# Patient Record
Sex: FEMALE | Race: WHITE | NOT HISPANIC OR LATINO | Employment: PART TIME | URBAN - NONMETROPOLITAN AREA
[De-identification: names, ages, dates, MRNs, and addresses within clinical notes are randomized per-mention and may not be internally consistent; named-entity substitution may affect disease eponyms.]

---

## 2023-03-28 ENCOUNTER — OFFICE VISIT (OUTPATIENT)
Dept: FAMILY MEDICINE CLINIC | Facility: CLINIC | Age: 38
End: 2023-03-28

## 2023-03-28 VITALS
OXYGEN SATURATION: 98 % | BODY MASS INDEX: 22.2 KG/M2 | TEMPERATURE: 98 F | SYSTOLIC BLOOD PRESSURE: 126 MMHG | DIASTOLIC BLOOD PRESSURE: 78 MMHG | WEIGHT: 130 LBS | HEIGHT: 64 IN | HEART RATE: 90 BPM

## 2023-03-28 DIAGNOSIS — Z12.4 SCREENING FOR CERVICAL CANCER: ICD-10-CM

## 2023-03-28 DIAGNOSIS — Z23 NEED FOR TDAP VACCINATION: ICD-10-CM

## 2023-03-28 DIAGNOSIS — B37.0 THRUSH: ICD-10-CM

## 2023-03-28 DIAGNOSIS — Z00.00 ANNUAL PHYSICAL EXAM: Primary | ICD-10-CM

## 2023-03-28 DIAGNOSIS — Z11.4 SCREENING FOR HIV (HUMAN IMMUNODEFICIENCY VIRUS): ICD-10-CM

## 2023-03-28 DIAGNOSIS — Z11.59 NEED FOR HEPATITIS C SCREENING TEST: ICD-10-CM

## 2023-03-28 DIAGNOSIS — Z00.00 ROUTINE GENERAL MEDICAL EXAMINATION AT A HEALTH CARE FACILITY: ICD-10-CM

## 2023-03-28 DIAGNOSIS — F17.200 TOBACCO USE DISORDER: ICD-10-CM

## 2023-03-28 PROBLEM — F19.91 HISTORY OF ILLICIT DRUG USE: Status: ACTIVE | Noted: 2020-11-13

## 2023-03-28 NOTE — PROGRESS NOTES
ADULT ANNUAL PHYSICAL  [address] [facility]    NAME: Ronni Thrasher  AGE: 42 y o  SEX: female  : 1985     DATE: 3/28/2023     Assessment and Plan:   Pt is a 42 yr old female   Presents in office to establish care and need annual physical   Has an IUD in place for over 7 yrs now has had irregular periods and pelvic pain - referred to gyn for GYN exam , screening and has history of abnormal PAP     Discussed sooner then later follow up   Veronika Haynes 83 noted on exam - Nystatin ordered swish and spit   Smoker - discussed smoking cessation she is planning but not ready she will try to slow down on her own first and let me know if she needs help   History of alcohol and drug use - denies any use recently   Problem List Items Addressed This Visit        Other    Tobacco use disorder    Relevant Orders    Comprehensive metabolic panel    CBC and differential    TSH, 3rd generation with Free T4 reflex    Lipid panel    UA w Reflex to Microscopic w Reflex to Culture -Lab Collect   Other Visit Diagnoses     Annual physical exam    -  Primary    Screening for HIV (human immunodeficiency virus)        Relevant Orders    : HIV 1/2 AB/AG w Reflex [facility] for 2 yr old and above    Need for hepatitis C screening test        Relevant Orders    Hepatitis C antibody    Routine general medical examination at a health care facility        Relevant Orders    Comprehensive metabolic panel    CBC and differential    TSH, 3rd generation with Free T4 reflex    Lipid panel    UA w Reflex to Microscopic w Reflex to Culture -Lab Collect    BMI 22 0-22 9, adult        Screening for cervical cancer        Relevant Orders    Ambulatory Referral to Gynecology    Need for Tdap vaccination        Relevant Orders    Tdap Vaccine greater than or equal to 6yo    Thrush        Relevant Medications    nystatin (MYCOSTATIN) 500,000 units/5 mL suspension          Immunizations and preventive care screenings were discussed with patient today  Appropriate education was printed on patient's after visit summary  Counseling:  Alcohol/drug use: discussed moderation in alcohol intake, the recommendations for healthy alcohol use, and avoidance of illicit drug use  Dental Health: discussed importance of regular tooth brushing, flossing, and dental visits  Injury prevention: discussed safety/seat belts, safety helmets, smoke detectors, carbon dioxide detectors, and smoking near bedding or upholstery  Sexual health: discussed sexually transmitted diseases, partner selection, use of condoms, avoidance of unintended pregnancy, and contraceptive alternatives  · Exercise: the importance of regular exercise/physical activity was discussed  Recommend exercise 3-5 times per week for at least 30 minutes  Depression Screening and Follow-up Plan: Patient was screened for depression during today's encounter  They screened negative with a PHQ-2 score of 0  Tobacco Cessation Counseling: Tobacco cessation counseling was provided  The patient is sincerely urged to quit consumption of tobacco  She is not ready to quit tobacco  Medication options and side effects of medication discussed  Patient agreed to medication  She will try on her own to slow down  Discussed options available will keep me posted         Return in 3 months (on 6/28/2023)  Chief Complaint:     Chief Complaint   Patient presents with   • Establish Care   • Physical Exam      History of Present Illness:     Adult Annual Physical   Patient here for a comprehensive physical exam  The patient reports problems - see HPI   Diet and Physical Activity  · Diet/Nutrition: well balanced diet  · Exercise: no formal exercise        Depression Screening  PHQ-2/9 Depression Screening    Little interest or pleasure in doing things: 0 - not at all  Feeling down, depressed, or hopeless: 0 - not at all  PHQ-2 Score: 0  PHQ-2 Interpretation: Negative depression screen General Health  · Sleep: sleeps well  · Hearing: normal - bilateral   · Vision: no vision problems  · Dental: no dental visits for >1 year  /GYN Health  · Last menstrual period: irregular   · Contraceptive method: IUD placement, Needs follow up to be changed   · History of STDs?: no      Review of Systems:     Review of Systems   Constitutional: Negative for chills, fatigue, fever and unexpected weight change  HENT: Negative for congestion, rhinorrhea, sore throat and voice change  Eyes: Negative  Respiratory: Negative for cough and shortness of breath  Smoker    Cardiovascular: Negative for chest pain and palpitations  Gastrointestinal: Positive for abdominal pain (pelvic pressure --> follow up GYN )  Negative for abdominal distention, nausea and vomiting  Endocrine: Negative  Genitourinary: Negative for difficulty urinating, flank pain and frequency  Musculoskeletal: Negative for arthralgias and myalgias  Skin: Negative for rash  Allergic/Immunologic: Negative for environmental allergies  Neurological: Negative for headaches  Hematological: Negative for adenopathy  Psychiatric/Behavioral: Negative for sleep disturbance and suicidal ideas  The patient is not nervous/anxious  Past Medical History:     History reviewed  No pertinent past medical history     Past Surgical History:     Past Surgical History:   Procedure Laterality Date   • CERVICAL BIOPSY  W/ LOOP ELECTRODE EXCISION     •  SECTION      x2   • CHOLECYSTECTOMY        Social History:     Social History     Socioeconomic History   • Marital status: Single     Spouse name: None   • Number of children: None   • Years of education: None   • Highest education level: None   Occupational History   • None   Tobacco Use   • Smoking status: Every Day     Packs/day: 1 00     Years: 25 00     Pack years: 25 00     Types: Cigarettes   • Smokeless tobacco: Never   Vaping Use   • Vaping Use: Every "day   • Substances: THC, CBD   Substance and Sexual Activity   • 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   • Drug use: 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, Heroin     Comment: sober for 4 years   • Sexual activity: Yes   Other Topics Concern   • None   Social History Narrative   • None     Social Determinants of Health     Financial Resource Strain: Not on file   Food Insecurity: Not on file   Transportation Needs: Not on file   Physical Activity: Not on file   Stress: Not on file   Social Connections: Not on file   Intimate Partner Violence: Not on file   Housing Stability: Not on file      Family History:     History reviewed  No pertinent family history  Current Medications:     Current Outpatient Medications   Medication Sig Dispense Refill   • nystatin (MYCOSTATIN) 500,000 units/5 mL suspension Apply 5 mL (500,000 Units total) to the mouth or throat 4 (four) times a day for 14 days 280 mL 0     No current facility-administered medications for this visit  Allergies: Allergies   Allergen Reactions   • Codeine Itching     When using tylenol #4        Physical Exam:     /78 (BP Location: Left arm, Patient Position: Sitting, Cuff Size: Adult)   Pulse 90   Temp 98 °F (36 7 °C)   Ht 5' 4\" (1 626 m)   Wt 59 kg (130 lb)   SpO2 98%   BMI 22 31 kg/m²     Physical Exam  Vitals and nursing note reviewed  Constitutional:       Appearance: Normal appearance  Comments: BMI 22 31    HENT:      Head: Atraumatic  Nose: No congestion or rhinorrhea  Comments: THRUSH on the tongue      Mouth/Throat:      Pharynx: No posterior oropharyngeal erythema  Cardiovascular:      Rate and Rhythm: Normal rate and regular rhythm  Heart sounds: Normal heart sounds  Pulmonary:      Breath sounds: Normal breath sounds  Abdominal:      Palpations: Abdomen is soft  Musculoskeletal:         General: Normal range of motion  Cervical back: Normal range of motion     Skin:     " General: Skin is warm  Capillary Refill: Capillary refill takes less than 2 seconds  Neurological:      Mental Status: She is alert and oriented to person, place, and time     Psychiatric:         Mood and Affect: Mood normal          Behavior: Behavior normal           Akiko Cavazos, [address] [facility]

## 2023-03-28 NOTE — PATIENT INSTRUCTIONS
Wellness Visit for Adults   AMBULATORY CARE:   A wellness visit  is when you see your healthcare provider to get screened for health problems  Your healthcare provider will also give you advice on how to stay healthy  Write down your questions so you remember to ask them  Ask your healthcare provider how often you should have a wellness visit  What happens at a wellness visit:  Your healthcare provider will ask about your health, and your family history of health problems  This includes high blood pressure, heart disease, and cancer  He or she will ask if you have symptoms that concern you, if you smoke, and about your mood  You may also be asked about your intake of medicines, supplements, food, and alcohol  Any of the following may be done:  • Your weight  will be checked  Your height may also be checked so your body mass index (BMI) can be calculated  Your BMI shows if you are at a healthy weight  • Your blood pressure  and heart rate will be checked  Your temperature may also be checked  • Blood and urine tests  may be done  Blood tests may be done to check your cholesterol levels  Abnormal cholesterol levels increase your risk for heart disease and stroke  You may also need a blood or urine test to check for diabetes if you are at increased risk  Urine tests may be done to look for signs of an infection or kidney disease  • A physical exam  includes checking your heartbeat and lungs with a stethoscope  Your healthcare provider may also check your skin to look for sun damage  • Screening tests  may be recommended  A screening test is done to check for diseases that may not cause symptoms  The screening tests you may need depend on your age, gender, family history, and lifestyle habits  For example, colorectal screening may be recommended if you are 48years old or older  Screening tests you need if you are a woman:   • A Pap smear  is used to screen for cervical cancer   Pap smears are usually done every 3 to 5 years depending on your age  You may need them more often if you have had abnormal Pap smear test results in the past  Ask your healthcare provider how often you should have a Pap smear  • A mammogram  is an x-ray of your breasts to screen for breast cancer  Experts recommend mammograms every 2 years starting at age 48 years  You may need a mammogram at age 52 years or younger if you have an increased risk for breast cancer  Talk to your healthcare provider about when you should start having mammograms and how often you need them  Vaccines you may need:   • Get an influenza vaccine  every year  The influenza vaccine protects you from the flu  Several types of viruses cause the flu  The viruses change over time, so new vaccines are made each year  • Get a tetanus-diphtheria (Td) booster vaccine  every 10 years  This vaccine protects you against tetanus and diphtheria  Tetanus is a severe infection that may cause painful muscle spasms and lockjaw  Diphtheria is a severe bacterial infection that causes a thick covering in the back of your mouth and throat  • Get a human papillomavirus (HPV) vaccine  if you are female and aged 23 to 32 or male 23 to 24 and never received it  This vaccine protects you from HPV infection  HPV is the most common infection spread by sexual contact  HPV may also cause vaginal, penile, and anal cancers  • Get a pneumococcal vaccine  if you are aged 72 years or older  The pneumococcal vaccine is an injection given to protect you from pneumococcal disease  Pneumococcal disease is an infection caused by pneumococcal bacteria  The infection may cause pneumonia, meningitis, or an ear infection  • Get a shingles vaccine  if you are 60 or older, even if you have had shingles before  The shingles vaccine is an injection to protect you from the varicella-zoster virus  This is the same virus that causes chickenpox   Shingles is a painful rash that develops in people who had chickenpox or have been exposed to the virus  How to eat healthy:  My Plate is a model for planning healthy meals  It shows the types and amounts of foods that should go on your plate  Fruits and vegetables make up about half of your plate, and grains and protein make up the other half  A serving of dairy is included on the side of your plate  The amount of calories and serving sizes you need depends on your age, gender, weight, and height  Examples of healthy foods are listed below:  • Eat a variety of vegetables  such as dark green, red, and orange vegetables  You can also include canned vegetables low in sodium (salt) and frozen vegetables without added butter or sauces  • Eat a variety of fresh fruits , canned fruit in 100% juice, frozen fruit, and dried fruit  • Include whole grains  At least half of the grains you eat should be whole grains  Examples include whole-wheat bread, wheat pasta, brown rice, and whole-grain cereals such as oatmeal     • Eat a variety of protein foods such as seafood (fish and shellfish), lean meat, and poultry without skin (turkey and chicken)  Examples of lean meats include pork leg, shoulder, or tenderloin, and beef round, sirloin, tenderloin, and extra lean ground beef  Other protein foods include eggs and egg substitutes, beans, peas, soy products, nuts, and seeds  • Choose low-fat dairy products such as skim or 1% milk or low-fat yogurt, cheese, and cottage cheese  • Limit unhealthy fats  such as butter, hard margarine, and shortening  Exercise:  Exercise at least 30 minutes per day on most days of the week  Some examples of exercise include walking, biking, dancing, and swimming  You can also fit in more physical activity by taking the stairs instead of the elevator or parking farther away from stores  Include muscle strengthening activities 2 days each week  Regular exercise provides many health benefits   It helps you manage your weight, and decreases your risk for type 2 diabetes, heart disease, stroke, and high blood pressure  Exercise can also help improve your mood  Ask your healthcare provider about the best exercise plan for you  General health and safety guidelines:   • Do not smoke  Nicotine and other chemicals in cigarettes and cigars can cause lung damage  Ask your healthcare provider for information if you currently smoke and need help to quit  E-cigarettes or smokeless tobacco still contain nicotine  Talk to your healthcare provider before you use these products  • Limit alcohol  A drink of alcohol is 12 ounces of beer, 5 ounces of wine, or 1½ ounces of liquor  • Lose weight, if needed  Being overweight increases your risk of certain health conditions  These include heart disease, high blood pressure, type 2 diabetes, and certain types of cancer  • Protect your skin  Do not sunbathe or use tanning beds  Use sunscreen with a SPF 15 or higher  Apply sunscreen at least 15 minutes before you go outside  Reapply sunscreen every 2 hours  Wear protective clothing, hats, and sunglasses when you are outside  • Drive safely  Always wear your seatbelt  Make sure everyone in your car wears a seatbelt  A seatbelt can save your life if you are in an accident  Do not use your cell phone when you are driving  This could distract you and cause an accident  Pull over if you need to make a call or send a text message  • Practice safe sex  Use latex condoms if are sexually active and have more than one partner  Your healthcare provider may recommend screening tests for sexually transmitted infections (STIs)  • Wear helmets, lifejackets, and protective gear  Always wear a helmet when you ride a bike or motorcycle, go skiing, or play sports that could cause a head injury  Wear protective equipment when you play sports  Wear a lifejacket when you are on a boat or doing water sports      © Copyright [employer] 2022 Information is for End User's use only and may not be sold, redistributed or otherwise used for commercial purposes  The above information is an  only  It is not intended as medical advice for individual conditions or treatments  Talk to your doctor, nurse or pharmacist before following any medical regimen to see if it is safe and effective for you  Weight Management   AMBULATORY CARE:   Why it is important to manage your weight:  Being overweight increases your risk of health conditions such as heart disease, high blood pressure, type 2 diabetes, and certain types of cancer  It can also increase your risk for osteoarthritis, sleep apnea, and other respiratory problems  Aim for a slow, steady weight loss  Even a small amount of weight loss can lower your risk of health problems  Risks of being overweight:  Extra weight can cause many health problems, including the following:  • Diabetes (high blood sugar level)    • High blood pressure or high cholesterol    • Heart disease    • Stroke    • Gallbladder or liver disease    • Cancer of the colon, breast, prostate, liver, or kidney    • Sleep apnea    • Arthritis or gout    Screening  is done to check for health conditions before you have signs or symptoms  If you are 28to 79years old, your blood sugar level may be checked every 3 years for signs of prediabetes or diabetes  Your healthcare provider will check your blood pressure at each visit  High blood pressure can lead to a stroke or other problems  Your provider may check for signs of heart disease, cancer, or other health problems  How to lose weight safely:  A safe and healthy way to lose weight is to eat fewer calories and get regular exercise  • You can lose up about 1 pound a week by decreasing the number of calories you eat by 500 calories each day  You can decrease calories by eating smaller portion sizes or by cutting out high-calorie foods   Read labels to find out how many calories are in the foods you eat          • You can also burn calories with exercise such as walking, swimming, or biking  You will be more likely to keep weight off if you make these changes part of your lifestyle  Exercise at least 30 minutes per day on most days of the week  You can also fit in more physical activity by taking the stairs instead of the elevator or parking farther away from stores  Ask your healthcare provider about the best exercise plan for you  Healthy meal plan for weight management:  A healthy meal plan includes a variety of foods, contains fewer calories, and helps you stay healthy  A healthy meal plan includes the following:     • Eat whole-grain foods more often  A healthy meal plan should contain fiber  Fiber is the part of grains, fruits, and vegetables that is not broken down by your body  Whole-grain foods are healthy and provide extra fiber in your diet  Some examples of whole-grain foods are whole-wheat breads and pastas, oatmeal, brown rice, and bulgur  • Eat a variety of vegetables every day  Include dark, leafy greens such as spinach, kale, [place] greens, and mustard greens  Eat yellow and orange vegetables such as carrots, sweet potatoes, and winter squash  • Eat a variety of fruits every day  Choose fresh or canned fruit (canned in its own juice or light syrup) instead of juice  Fruit juice has very little or no fiber  • Eat low-fat dairy foods  Drink fat-free (skim) milk or 1% milk  Eat fat-free yogurt and low-fat cottage cheese  Try low-fat cheeses such as mozzarella and other reduced-fat cheeses  • Choose meat and other protein foods that are low in fat  Choose beans or other legumes such as split peas or lentils  Choose fish, skinless poultry (chicken or turkey), or lean cuts of red meat (beef or pork)  Before you cook meat or poultry, cut off any visible fat  • Use less fat and oil  Try baking foods instead of frying them   Add less fat, such as margarine, sour cream, regular salad dressing and mayonnaise to foods  Eat fewer high-fat foods  Some examples of high-fat foods include french fries, doughnuts, ice cream, and cakes  • Eat fewer sweets  Limit foods and drinks that are high in sugar  This includes candy, cookies, regular soda, and sweetened drinks  Ways to decrease calories:   • Eat smaller portions  ? Use a small plate with smaller servings  ? Do not eat second helpings  ? When you eat at a restaurant, ask for a box and place half of your meal in the box before you eat  ? Share an entrée with someone else  • Replace high-calorie snacks with healthy, low-calorie snacks  ? Choose fresh fruit, vegetables, fat-free rice cakes, or air-popped popcorn instead of potato chips, nuts, or chocolate  ? Choose water or calorie-free drinks instead of soda or sweetened drinks  • Do not shop for groceries when you are hungry  You may be more likely to make unhealthy food choices  Take a grocery list of healthy foods and shop after you have eaten  • Eat regular meals  Do not skip meals  Skipping meals can lead to overeating later in the day  This can make it harder for you to lose weight  Eat a healthy snack in place of a meal if you do not have time to eat a regular meal  Talk with a dietitian to help you create a meal plan and schedule that is right for you  Other things to consider as you try to lose weight:   • Be aware of situations that may give you the urge to overeat, such as eating while watching television  Find ways to avoid these situations  For example, read a book, go for a walk, or do crafts  • Meet with a weight loss support group or friends who are also trying to lose weight  This may help you stay motivated to continue working on your weight loss goals  © Copyright [employer] 2022 Information is for End User's use only and may not be sold, redistributed or otherwise used for commercial purposes    The above information is an  only  It is not intended as medical advice for individual conditions or treatments  Talk to your doctor, nurse or pharmacist before following any medical regimen to see if it is safe and effective for you  Cigarette Smoking and Your Health   AMBULATORY CARE:   Risks to your health if you smoke:  Nicotine and other chemicals found in tobacco and e-cigarettes can damage every cell in your body  Even if you are a light smoker, you have an increased risk for cancer, heart disease, and lung disease  If you are pregnant or have diabetes, smoking increases your risk for complications  Nicotine can affect an adolescent's developing brain  This can lead to trouble thinking, learning, or paying attention  Benefits to your health if you stop smoking:   • You decrease respiratory symptoms such as coughing, wheezing, and shortness of breath  • You reduce your risk for cancers of the lung, mouth, throat, kidney, bladder, pancreas, stomach, and cervix  If you already have cancer, you increase the benefits of chemotherapy  You also reduce your risk for cancer returning or a second cancer from developing  • You reduce your risk for heart disease, blood clots, heart attack, and stroke  • You reduce your risk for lung infections, and diseases such as pneumonia, asthma, chronic bronchitis, and emphysema  • Your circulation improves  More oxygen can be delivered to your body  If you have diabetes, you lower your risk for complications, such as kidney, artery, and eye diseases  You also lower your risk for nerve damage  Nerve damage can lead to amputations, poor vision, and blindness  • You improve your body's ability to heal and to fight infections  • An adolescent can help his or her brain and body develop in a healthy way  Talk to your adolescent about all the health risks of nicotine  If you can, start talking about nicotine when your child is younger than 12 years   This may make it easier for him or her not to start using nicotine as a teenager or adult  Explain to him or her that it is best never to start  It can be hard to try to quit later  Benefits to the health of others if you stop smoking:  Tobacco is harmful to nonsmokers who breathe in your secondhand smoke  The following are ways the health of others around you may improve when you stop smoking:  • You lower the risks for lung cancer and heart disease in nonsmoking adults  • If you are pregnant, you lower the risk for miscarriage, early delivery, low birth weight, and stillbirth  You also lower your baby's risk for SIDS, obesity, developmental delay, and neurobehavioral problems, such as ADHD  • If you have children, you lower their risk for ear infections, colds, pneumonia, bronchitis, and asthma  Follow up with your doctor as directed:  Write down your questions so you remember to ask them during your visits  For support and more information:   • American [employer] Association  [address] [facility]  [address]  Phone: [facility] [address]  Phone: 4- 081 - [phone]  Web Address: [employer]    • Smokefree  gov  Phone: 8- 894 - 047-4524  Web Address: www smokefree  gov  © Copyright Uriah Saba 2022 Information is for End User's use only and may not be sold, redistributed or otherwise used for commercial purposes  The above information is an  only  It is not intended as medical advice for individual conditions or treatments  Talk to your doctor, nurse or pharmacist before following any medical regimen to see if it is safe and effective for you  Cholesterol and Your Health   AMBULATORY CARE:   Cholesterol  is a waxy, fat-like substance  Your body uses cholesterol to make hormones and new cells, and to protect nerves  Cholesterol is made by your body  It also comes from certain foods you eat, such as meat and dairy products  Your healthcare provider can help you set goals for your cholesterol levels   He or she can help you create a plan to meet your goals  Cholesterol level goals: Your cholesterol level goals depend on your risk for heart disease, your age, and your other health conditions  The following are general guidelines:  • Total cholesterol  includes low-density lipoprotein (LDL), high-density lipoprotein (HDL), and triglyceride levels  The total cholesterol level should be lower than 200 mg/dL and is best at about 150 mg/dL  • LDL cholesterol  is called bad cholesterol  because it forms plaque in your arteries  As plaque builds up, your arteries become narrow, and less blood flows through  When plaque decreases blood flow to your heart, you may have chest pain  If plaque completely blocks an artery that brings blood to your heart, you may have a heart attack  Plaque can break off and form blood clots  Blood clots may block arteries in your brain and cause a stroke  The level should be less than 130 mg/dL and is best at about 100 mg/dL  • HDL cholesterol  is called good cholesterol  because it helps remove LDL cholesterol from your arteries  It does this by attaching to LDL cholesterol and carrying it to your liver  Your liver breaks down LDL cholesterol so your body can get rid of it  High levels of HDL cholesterol can help prevent a heart attack and stroke  Low levels of HDL cholesterol can increase your risk for heart disease, heart attack, and stroke  The level should be 60 mg/dL or higher  • Triglycerides  are a type of fat that store energy from foods you eat  High levels of triglycerides also cause plaque buildup  This can increase your risk for a heart attack or stroke  If your triglyceride level is high, your LDL cholesterol level may also be high  The level should be less than 150 mg/dL      Any of the following can increase your risk for high cholesterol:   • Smoking cigarettes    • Being overweight or obese, or not getting enough exercise    • Drinking large amounts of alcohol    • A medical condition such as hypertension (high blood pressure) or diabetes    • Certain genes passed from your parents to you    • Age older than 72 years    What you need to know about having your cholesterol levels checked: Adults 21to 39years of age should have their cholesterol levels checked every 4 to 6 years  Adults 45 years or older should have their cholesterol checked every 1 to 2 years  You may need your cholesterol checked more often, or at a younger age, if you have risk factors for heart disease  You may also need to have your cholesterol checked more often if you have other health conditions, such as diabetes  Blood tests are used to check cholesterol levels  Blood tests measure your levels of triglycerides, LDL cholesterol, and HDL cholesterol  How healthy fats affect your cholesterol levels:  Healthy fats, also called unsaturated fats, help lower LDL cholesterol and triglyceride levels  Healthy fats include the following:  • Monounsaturated fats  are found in foods such as olive oil, canola oil, avocado, nuts, and olives  • Polyunsaturated fats,  such as omega 3 fats, are found in fish, such as salmon, trout, and tuna  They can also be found in plant foods such as flaxseed, walnuts, and soybeans  How unhealthy fats affect your cholesterol levels:  Unhealthy fats increase LDL cholesterol and triglyceride levels  They are found in foods high in cholesterol, saturated fat, and trans fat:  • Cholesterol  is found in eggs, dairy, and meat  • Saturated fat  is found in butter, cheese, ice cream, whole milk, and coconut oil  Saturated fat is also found in meat, such as sausage, hot dogs, and bologna  • Trans fat  is found in liquid oils and is used in fried and baked foods  Foods that contain trans fats include chips, crackers, muffins, sweet rolls, microwave popcorn, and cookies  Treatment  for high cholesterol will also decrease your risk of heart disease, heart attack, and stroke  Treatment may include any of the following:  • Lifestyle changes  may include food, exercise, weight loss, and quitting smoking  You may also need to decrease the amount of alcohol you drink  Your healthcare provider will want you to start with lifestyle changes  Other treatment may be added if lifestyle changes are not enough  Your healthcare provider may recommend you work with a team to manage hyperlipidemia  The team may include medical experts such as a dietitian, an exercise or physical therapist, and a behavior therapist  Your family members may be included in helping you create lifestyle changes  • Medicines  may be given to lower your LDL cholesterol, triglyceride levels, or total cholesterol level  You may need medicines to lower your cholesterol if any of the following is true:    ? You have a history of stroke, TIA, unstable angina, or a heart attack  ? Your LDL cholesterol level is 190 mg/dL or higher  ? You are age 36 to 76 years, have diabetes or heart disease risk factors, and your LDL cholesterol is 70 mg/dL or higher  • Supplements  include fish oil, red yeast rice, and garlic  Fish oil may help lower your triglyceride and LDL cholesterol levels  It may also increase your HDL cholesterol level  Red yeast rice may help decrease your total cholesterol level and LDL cholesterol level  Garlic may help lower your total cholesterol level  Do not take any supplements without talking to your healthcare provider  Food changes you can make to lower your cholesterol levels:  A dietitian can help you create a healthy eating plan  He or she can show you how to read food labels and choose foods low in saturated fat, trans fats, and cholesterol  • Decrease the total amount of fat you eat  Choose lean meats, fat-free or 1% fat milk, and low-fat dairy products, such as yogurt and cheese  Try to limit or avoid red meats   Limit or do not eat fried foods or baked goods, such as cookies  • Replace unhealthy fats with healthy fats  Cook foods in olive oil or canola oil  Choose soft margarines that are low in saturated fat and trans fat  Seeds, nuts, and avocados are other examples of healthy fats  • Eat foods with omega-3 fats  Examples include salmon, tuna, mackerel, walnuts, and flaxseed  Eat fish 2 times per week  Pregnant women should not eat fish that have high levels of mercury, such as shark, swordfish, and luis miguel mackerel  • Increase the amount of high-fiber foods you eat  High-fiber foods can help lower your LDL cholesterol  Aim to get between 20 and 30 grams of fiber each day  Fruits and vegetables are high in fiber  Eat at least 5 servings each day  Other high-fiber foods are whole-grain or whole-wheat breads, pastas, or cereals, and brown rice  Eat 3 ounces of whole-grain foods each day  Increase fiber slowly  You may have abdominal discomfort, bloating, and gas if you add fiber to your diet too quickly  • Eat healthy protein foods  Examples include low-fat dairy products, skinless chicken and turkey, fish, and nuts  • Limit foods and drinks that are high in sugar  Your dietitian or healthcare provider can help you create daily limits for high-sugar foods and drinks  The limit may be lower if you have diabetes or another health condition  Limits can also help you lose weight if needed  Lifestyle changes you can make to lower your cholesterol levels:   • Maintain a healthy weight  Ask your healthcare provider what a healthy weight is for you  Ask him or her to help you create a weight loss plan if needed  Weight loss can decrease your total cholesterol and triglyceride levels  Weight loss may also help keep your blood pressure at a healthy level  • Be physically active throughout the day  Physical activity, such as exercise, can help lower your total cholesterol level and maintain a healthy weight   Physical activity can also help increase your HDL cholesterol level  Work with your healthcare provider to create an program that is right for you  Get at least 30 to 40 minutes of moderate physical activity most days of the week  Examples of exercise include brisk walking, swimming, or biking  Also include strength training at least 2 times each week  Your healthcare providers can help you create a physical activity plan  • Do not smoke  Nicotine and other chemicals in cigarettes and cigars can raise your cholesterol levels  Ask your healthcare provider for information if you currently smoke and need help to quit  E-cigarettes or smokeless tobacco still contain nicotine  Talk to your healthcare provider before you use these products  • Limit or do not drink alcohol  Alcohol can increase your triglyceride levels  Ask your healthcare provider before you drink alcohol  Ask how much is okay for you to drink in 24 hours or 1 week  Follow up with your doctor as directed:  Write down your questions so you remember to ask them during your visits  © Copyright [employer] 2022 Information is for End User's use only and may not be sold, redistributed or otherwise used for commercial purposes  The above information is an  only  It is not intended as medical advice for individual conditions or treatments  Talk to your doctor, nurse or pharmacist before following any medical regimen to see if it is safe and effective for you

## 2023-06-21 ENCOUNTER — TELEPHONE (OUTPATIENT)
Dept: FAMILY MEDICINE CLINIC | Facility: CLINIC | Age: 38
End: 2023-06-21

## 2023-07-13 ENCOUNTER — LAB (OUTPATIENT)
Dept: LAB | Facility: CLINIC | Age: 38
End: 2023-07-13
Payer: COMMERCIAL

## 2023-07-13 DIAGNOSIS — Z11.4 SCREENING FOR HIV (HUMAN IMMUNODEFICIENCY VIRUS): ICD-10-CM

## 2023-07-13 DIAGNOSIS — Z11.59 NEED FOR HEPATITIS C SCREENING TEST: ICD-10-CM

## 2023-07-13 DIAGNOSIS — F17.200 TOBACCO USE DISORDER: ICD-10-CM

## 2023-07-13 DIAGNOSIS — Z00.00 ROUTINE GENERAL MEDICAL EXAMINATION AT A HEALTH CARE FACILITY: ICD-10-CM

## 2023-07-13 LAB
ALBUMIN SERPL BCP-MCNC: 3.7 G/DL (ref 3.5–5)
ALP SERPL-CCNC: 56 U/L (ref 46–116)
ALT SERPL W P-5'-P-CCNC: 12 U/L (ref 12–78)
ANION GAP SERPL CALCULATED.3IONS-SCNC: 6 MMOL/L
AST SERPL W P-5'-P-CCNC: 12 U/L (ref 5–45)
BASOPHILS # BLD AUTO: 0.09 THOUSANDS/ÂΜL (ref 0–0.1)
BASOPHILS NFR BLD AUTO: 1 % (ref 0–1)
BILIRUB SERPL-MCNC: 0.63 MG/DL (ref 0.2–1)
BUN SERPL-MCNC: 14 MG/DL (ref 5–25)
CALCIUM SERPL-MCNC: 8.8 MG/DL (ref 8.3–10.1)
CHLORIDE SERPL-SCNC: 113 MMOL/L (ref 96–108)
CHOLEST SERPL-MCNC: 150 MG/DL
CO2 SERPL-SCNC: 21 MMOL/L (ref 21–32)
CREAT SERPL-MCNC: 0.83 MG/DL (ref 0.6–1.3)
EOSINOPHIL # BLD AUTO: 0.35 THOUSAND/ÂΜL (ref 0–0.61)
EOSINOPHIL NFR BLD AUTO: 3 % (ref 0–6)
ERYTHROCYTE [DISTWIDTH] IN BLOOD BY AUTOMATED COUNT: 12.8 % (ref 11.6–15.1)
GFR SERPL CREATININE-BSD FRML MDRD: 89 ML/MIN/1.73SQ M
GLUCOSE P FAST SERPL-MCNC: 91 MG/DL (ref 65–99)
HCT VFR BLD AUTO: 43.3 % (ref 34.8–46.1)
HDLC SERPL-MCNC: 68 MG/DL
HGB BLD-MCNC: 14.3 G/DL (ref 11.5–15.4)
IMM GRANULOCYTES # BLD AUTO: 0.05 THOUSAND/UL (ref 0–0.2)
IMM GRANULOCYTES NFR BLD AUTO: 0 % (ref 0–2)
LDLC SERPL CALC-MCNC: 70 MG/DL (ref 0–100)
LYMPHOCYTES # BLD AUTO: 3.19 THOUSANDS/ÂΜL (ref 0.6–4.47)
LYMPHOCYTES NFR BLD AUTO: 28 % (ref 14–44)
MCH RBC QN AUTO: 30.6 PG (ref 26.8–34.3)
MCHC RBC AUTO-ENTMCNC: 33 G/DL (ref 31.4–37.4)
MCV RBC AUTO: 93 FL (ref 82–98)
MONOCYTES # BLD AUTO: 0.88 THOUSAND/ÂΜL (ref 0.17–1.22)
MONOCYTES NFR BLD AUTO: 8 % (ref 4–12)
NEUTROPHILS # BLD AUTO: 6.72 THOUSANDS/ÂΜL (ref 1.85–7.62)
NEUTS SEG NFR BLD AUTO: 60 % (ref 43–75)
NONHDLC SERPL-MCNC: 82 MG/DL
NRBC BLD AUTO-RTO: 0 /100 WBCS
PLATELET # BLD AUTO: 288 THOUSANDS/UL (ref 149–390)
PMV BLD AUTO: 11.6 FL (ref 8.9–12.7)
POTASSIUM SERPL-SCNC: 4.1 MMOL/L (ref 3.5–5.3)
PROT SERPL-MCNC: 7.1 G/DL (ref 6.4–8.4)
RBC # BLD AUTO: 4.67 MILLION/UL (ref 3.81–5.12)
SODIUM SERPL-SCNC: 140 MMOL/L (ref 135–147)
TRIGL SERPL-MCNC: 60 MG/DL
TSH SERPL DL<=0.05 MIU/L-ACNC: 1.03 UIU/ML (ref 0.45–4.5)
WBC # BLD AUTO: 11.28 THOUSAND/UL (ref 4.31–10.16)

## 2023-07-13 PROCEDURE — 86803 HEPATITIS C AB TEST: CPT

## 2023-07-13 PROCEDURE — 80061 LIPID PANEL: CPT

## 2023-07-13 PROCEDURE — 80053 COMPREHEN METABOLIC PANEL: CPT

## 2023-07-13 PROCEDURE — 36415 COLL VENOUS BLD VENIPUNCTURE: CPT

## 2023-07-13 PROCEDURE — 85025 COMPLETE CBC W/AUTO DIFF WBC: CPT

## 2023-07-13 PROCEDURE — 87389 HIV-1 AG W/HIV-1&-2 AB AG IA: CPT

## 2023-07-13 PROCEDURE — 84443 ASSAY THYROID STIM HORMONE: CPT

## 2023-07-14 LAB
HCV AB SER QL: NORMAL
HIV 1+2 AB+HIV1 P24 AG SERPL QL IA: NORMAL
HIV 2 AB SERPL QL IA: NORMAL
HIV1 AB SERPL QL IA: NORMAL
HIV1 P24 AG SERPL QL IA: NORMAL

## 2023-07-14 NOTE — RESULT ENCOUNTER NOTE
Please keep appt as planned to further discuss   Elevated WBC is she feeling ok if so it can wait   Sometimes smoking or exposure to smoke can cause elevated WBC and dehydration   Hydrate well and avoid smoking

## 2023-07-19 ENCOUNTER — OFFICE VISIT (OUTPATIENT)
Dept: FAMILY MEDICINE CLINIC | Facility: CLINIC | Age: 38
End: 2023-07-19
Payer: COMMERCIAL

## 2023-07-19 VITALS
HEART RATE: 82 BPM | HEIGHT: 64 IN | OXYGEN SATURATION: 99 % | TEMPERATURE: 97.5 F | WEIGHT: 127 LBS | SYSTOLIC BLOOD PRESSURE: 110 MMHG | DIASTOLIC BLOOD PRESSURE: 74 MMHG | BODY MASS INDEX: 21.68 KG/M2
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
## 2023-07-19 DIAGNOSIS — D72.829 LEUKOCYTOSIS, UNSPECIFIED TYPE: ICD-10-CM

## 2023-07-19 DIAGNOSIS — N89.8 VAGINAL DISCHARGE: ICD-10-CM

## 2023-07-19 PROCEDURE — 99213 OFFICE O/P EST LOW 20 MIN: CPT | Performed by: NURSE PRACTITIONER

## 2023-07-19 RX ORDER — FLUCONAZOLE 150 MG/1
150 TABLET ORAL WEEKLY
Qty: 2 TABLET | Refills: 0 | Status: SHIPPED | OUTPATIENT
Start: 2023-07-19 | End: 2023-08-02

## 2023-07-19 RX ORDER — CEPHALEXIN 500 MG/1
500 CAPSULE ORAL EVERY 8 HOURS SCHEDULED
Qty: 21 CAPSULE | Refills: 0 | Status: SHIPPED | OUTPATIENT
Start: 2023-07-19 | End: 2023-07-26

## 2023-07-19 NOTE — PROGRESS NOTES
Assessment/Plan:    Pt is a 42 yr old female   Presents in office for follow up lab results and has an insect bite to the back   Also discussed urinary issues and complaints of vaginal discharge   Discussed treatment options  Follow up GYN visit recommended      Problem List Items Addressed This Visit    None  Visit Diagnoses     Insect bite, unspecified site, initial encounter    -  Primary    Relevant Medications    cephalexin (KEFLEX) 500 mg capsule    fluconazole (DIFLUCAN) 150 mg tablet    mupirocin (BACTROBAN) 2 % ointment    Vaginal discharge        Relevant Medications    cephalexin (KEFLEX) 500 mg capsule    fluconazole (DIFLUCAN) 150 mg tablet    mupirocin (BACTROBAN) 2 % ointment            Subjective:      Patient ID: Megan Molina is a 42 y.o. female. Pt is a 42 yr old female   Presents in office for follow up lab results and has an insect bite to the back   Also discussed urinary issues and complaints of vaginal discharge   Discussed treatment options  Follow up GYN visit recommended       The following portions of the patient's history were reviewed and updated as appropriate:   Past Medical History:  She has no past medical history on file.,  _______________________________________________________________________  Medical Problems:  does not have any pertinent problems on file.,  _______________________________________________________________________  Past Surgical History:   has a past surgical history that includes Cholecystectomy ();  section; and Cervical biopsy w/ loop electrode excision (). ,  _______________________________________________________________________  Family History:  family history is not on file.,  _______________________________________________________________________  Social History:   reports that she has been smoking cigarettes. She has a 25.00 pack-year smoking history.  She has never used smokeless tobacco. She reports that she does not currently use alcohol. She reports that she does not currently use drugs after having used the following drugs: Oxycodone and Heroin. ,  _______________________________________________________________________  Allergies:  is allergic to codeine. .  _______________________________________________________________________  Current Outpatient Medications   Medication Sig Dispense Refill   • cephalexin (KEFLEX) 500 mg capsule Take 1 capsule (500 mg total) by mouth every 8 (eight) hours for 7 days 21 capsule 0   • fluconazole (DIFLUCAN) 150 mg tablet Take 1 tablet (150 mg total) by mouth once a week for 14 days 2 tablet 0   • mupirocin (BACTROBAN) 2 % ointment Apply topically 3 (three) times a day for 10 days 22 g 0     No current facility-administered medications for this visit.     _______________________________________________________________________  Review of Systems   Constitutional: Negative for chills, fatigue, fever and unexpected weight change. HENT: Negative for congestion, rhinorrhea, sore throat and voice change. Eyes: Negative. Respiratory: Negative for cough and shortness of breath. Smoker    Cardiovascular: Negative for chest pain and palpitations. Gastrointestinal: Positive for abdominal pain (pelvic pressure --> follow up GYN ). Negative for abdominal distention, nausea and vomiting. Endocrine: Negative. Genitourinary: Positive for difficulty urinating, dysuria and vaginal discharge. Negative for flank pain and frequency. Musculoskeletal: Negative for arthralgias and myalgias. Skin: Positive for rash (insect bite to back ). Allergic/Immunologic: Negative for environmental allergies. Neurological: Negative for headaches. Hematological: Negative for adenopathy. Elevated WBC   Psychiatric/Behavioral: Negative for sleep disturbance and suicidal ideas. The patient is not nervous/anxious.           Objective:  Vitals:    07/19/23 0851   BP: 110/74   BP Location: Left arm   Patient Position: Sitting   Cuff Size: Adult   Pulse: 82   Temp: 97.5 °F (36.4 °C)   SpO2: 99%   Weight: 57.6 kg (127 lb)   Height: 5' 4" (1.626 m)     Body mass index is 21.8 kg/m². Physical Exam  Vitals and nursing note reviewed. Constitutional:       Appearance: Normal appearance. HENT:      Head: Atraumatic. Eyes:      Extraocular Movements: Extraocular movements intact. Cardiovascular:      Rate and Rhythm: Normal rate and regular rhythm. Pulses: Normal pulses. Heart sounds: Normal heart sounds. Pulmonary:      Effort: Pulmonary effort is normal.      Breath sounds: Normal breath sounds. Abdominal:      Palpations: Abdomen is soft. Skin:     Findings: Erythema and rash present. Comments: Upper back insect bite    Neurological:      Mental Status: She is alert and oriented to person, place, and time. Psychiatric:         Mood and Affect: Mood normal.         Behavior: Behavior normal.           Contains abnormal data CBC and differential  Order: [id number]   Status: Final result      Visible to patient: No (inaccessible in [facility])      Next appt: 01/16/2024 at 11:00 AM in [address] [place] [address], [address] [place] [address])      Dx:  Tobacco use disorder; Routine general...      2 Result Notes       Component Ref Range & Units 7/13/23 10:53 AM   WBC 4.31 - 10.16 Thousand/uL 11.28 High     RBC 3.81 - 5.12 Million/uL 4.67    Hemoglobin 11.5 - 15.4 g/dL 14.3    Hematocrit 34.8 - 46.1 % 43.3    MCV 82 - 98 fL 93    MCH 26.8 - 34.3 pg 30.6    MCHC 31.4 - 37.4 g/dL 33.0    RDW 11.6 - 15.1 % 12.8    MPV 8.9 - 12.7 fL 11.6    Platelets 815 - 861 Thousands/uL 288    nRBC /100 WBCs 0    Neutrophils Relative 43 - 75 % 60    Immat GRANS % 0 - 2 % 0    Lymphocytes Relative 14 - 44 % 28    Monocytes Relative 4 - 12 % 8    Eosinophils Relative 0 - 6 % 3    Basophils Relative 0 - 1 % 1    Neutrophils Absolute 1.85 - 7.62 Thousands/µL 6.72    Immature Grans Absolute 0.00 - 0.20 Thousand/uL 0.05 Lymphocytes Absolute 0.60 - 4.47 Thousands/µL 3.19    Monocytes Absolute 0.17 - 1.22 Thousand/µL 0.88    Eosinophils Absolute 0.00 - 0.61 Thousand/µL 0.35    Basophils Absolute 0.00 - 0.10 Thousands/µL 0.09               Specimen Collected: 07/13/23 10:53 AM Last Resulted: 07/13/23  7:05 PM              Contains abnormal data Comprehensive metabolic panel  Order: [id number]   Status: Final result      Visible to patient: No (inaccessible in [facility])      Next appt: 01/16/2024 at 11:00 AM in [address] [place] [address], [facility])      Dx: Tobacco use disorder; Routine general...      2 Result Notes       Component Ref Range & Units 7/13/23 10:53 AM   Sodium 135 - 147 mmol/L 140    Potassium 3.5 - 5.3 mmol/L 4.1    Chloride 96 - 108 mmol/L 113 High     CO2 21 - 32 mmol/L 21    ANION GAP mmol/L 6    BUN 5 - 25 mg/dL 14    Creatinine 0.60 - 1.30 mg/dL 0.83    Comment: Standardized to IDMS reference method   Glucose, Fasting 65 - 99 mg/dL 91    Comment: Specimen collection should occur prior to Sulfasalazine administration due to the potential for falsely depressed results. Specimen collection should occur prior to Sulfapyridine administration due to the potential for falsely elevated results. Calcium 8.3 - 10.1 mg/dL 8.8    AST 5 - 45 U/L 12    Comment: Specimen collection should occur prior to Sulfasalazine administration due to the potential for falsely depressed results. ALT 12 - 78 U/L 12    Comment: Specimen collection should occur prior to Sulfasalazine and/or Sulfapyridine administration due to the potential for falsely depressed results. Alkaline Phosphatase 46 - 116 U/L 56    Total Protein 6.4 - 8.4 g/dL 7.1    Albumin 3.5 - 5.0 g/dL 3.7    Total Bilirubin 0.20 - 1.00 mg/dL 0.63    Comment: Use of this assay is not recommended for patients undergoing treatment with eltrombopag due to the potential for falsely elevated results.    eGFR ml/min/1.73sq m 89               [facility]    National Kidney Disease Foundation guidelines for Chronic Kidney Disease (CKD):   •  Stage 1 with normal or high GFR (GFR > 90 mL/min/1.73 square meters)   •  Stage 2 Mild CKD (GFR = 60-89 mL/min/1.73 square meters)   •  Stage 3A Moderate CKD (GFR = 45-59 mL/min/1.73 square meters)   •  Stage 3B Moderate CKD (GFR = 30-44 mL/min/1.73 square meters)   •  Stage 4 Severe CKD (GFR = 15-29 mL/min/1.73 square meters)   •  Stage 5 End Stage CKD (GFR <15 mL/min/1.73 square meters)   Note: GFR calculation is accurate only with a steady state creatinine      Specimen Collected: 07/13/23 10:53 AM Last Resulted: 07/13/23  7:35 PM         Lab Flowsheet      Order Details      View Encounter      Lab and Collection Details      Routing      Result History     View All Conversations on this Encounter           Related Result Highlights     Lipid panel  Final result 7/13/2023                       Result Care Coordination      Result Notes     Carlyle Rose   7/14/2023  4:22 PM EDT Back to Top      Advised pt     JEANIE Gamez   7/14/2023  4:16 PM EDT       Please keep appt as planned to further discuss   Elevated WBC is she feeling ok if so it can wait   Sometimes smoking or exposure to smoke can cause elevated WBC and dehydration   Hydrate well and avoid smoking            Contains abnormal data UA w Reflex to Microscopic w Reflex to Culture -Lab Collect  Order: [id number]   Status: Final result      Visible to patient: No (inaccessible in [facility])      Next appt: 01/16/2024 at 11:00 AM in [address] [facility] JEANIE Gamez)      Dx:  Tobacco use disorder; Routine general...     Specimen Information: Urine, Other    1 Result Note       Component Ref Range & Units 7/13/23 10:53 AM   Color, UA  Yellow    Clarity, UA  Turbid    Specific Gravity, UA 1.003 - 1.030 1.019    pH, UA 4.5, 5.0, 5.5, 6.0, 6.5, 7.0, 7.5, 8.0 5.5    Leukocytes, UA Negative Trace Abnormal     Nitrite, UA Negative Negative    Protein, UA Negative mg/dl Trace Abnormal     Glucose, UA Negative mg/dl Negative    Ketones, UA Negative mg/dl Negative    Urobilinogen, UA <2.0 mg/dl mg/dl <2.0    Bilirubin, UA Negative Negative    Occult Blood, UA Negative Moderate Abnormal

## 2024-01-18 ENCOUNTER — LAB (OUTPATIENT)
Dept: LAB | Facility: CLINIC | Age: 39
End: 2024-01-18
Payer: COMMERCIAL

## 2024-01-18 DIAGNOSIS — N89.8 VAGINAL DISCHARGE: ICD-10-CM

## 2024-01-18 DIAGNOSIS — D72.829 LEUKOCYTOSIS, UNSPECIFIED TYPE: ICD-10-CM
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
## 2024-01-18 LAB
AMORPH URATE CRY URNS QL MICRO: ABNORMAL
BACTERIA UR QL AUTO: ABNORMAL /HPF
BASOPHILS # BLD AUTO: 0.09 THOUSANDS/ÂΜL (ref 0–0.1)
BASOPHILS NFR BLD AUTO: 1 % (ref 0–1)
BILIRUB UR QL STRIP: NEGATIVE
CLARITY UR: ABNORMAL
COLOR UR: YELLOW
EOSINOPHIL # BLD AUTO: 0.26 THOUSAND/ÂΜL (ref 0–0.61)
EOSINOPHIL NFR BLD AUTO: 2 % (ref 0–6)
ERYTHROCYTE [DISTWIDTH] IN BLOOD BY AUTOMATED COUNT: 12.9 % (ref 11.6–15.1)
GLUCOSE UR STRIP-MCNC: NEGATIVE MG/DL
HCT VFR BLD AUTO: 42.1 % (ref 34.8–46.1)
HGB BLD-MCNC: 14 G/DL (ref 11.5–15.4)
HGB UR QL STRIP.AUTO: NEGATIVE
IMM GRANULOCYTES # BLD AUTO: 0.03 THOUSAND/UL (ref 0–0.2)
IMM GRANULOCYTES NFR BLD AUTO: 0 % (ref 0–2)
KETONES UR STRIP-MCNC: NEGATIVE MG/DL
LEUKOCYTE ESTERASE UR QL STRIP: NEGATIVE
LYMPHOCYTES # BLD AUTO: 4.09 THOUSANDS/ÂΜL (ref 0.6–4.47)
LYMPHOCYTES NFR BLD AUTO: 38 % (ref 14–44)
MCH RBC QN AUTO: 30.2 PG (ref 26.8–34.3)
MCHC RBC AUTO-ENTMCNC: 33.3 G/DL (ref 31.4–37.4)
MCV RBC AUTO: 91 FL (ref 82–98)
MONOCYTES # BLD AUTO: 0.81 THOUSAND/ÂΜL (ref 0.17–1.22)
MONOCYTES NFR BLD AUTO: 8 % (ref 4–12)
MUCOUS THREADS UR QL AUTO: ABNORMAL
NEUTROPHILS # BLD AUTO: 5.51 THOUSANDS/ÂΜL (ref 1.85–7.62)
NEUTS SEG NFR BLD AUTO: 51 % (ref 43–75)
NITRITE UR QL STRIP: NEGATIVE
NON-SQ EPI CELLS URNS QL MICRO: ABNORMAL /HPF
NRBC BLD AUTO-RTO: 0 /100 WBCS
PH UR STRIP.AUTO: 5.5 [PH]
PLATELET # BLD AUTO: 285 THOUSANDS/UL (ref 149–390)
PMV BLD AUTO: 11.1 FL (ref 8.9–12.7)
PROT UR STRIP-MCNC: ABNORMAL MG/DL
RBC # BLD AUTO: 4.64 MILLION/UL (ref 3.81–5.12)
RBC #/AREA URNS AUTO: ABNORMAL /HPF
SP GR UR STRIP.AUTO: 1.02 (ref 1–1.03)
UROBILINOGEN UR STRIP-ACNC: <2 MG/DL
WBC # BLD AUTO: 10.79 THOUSAND/UL (ref 4.31–10.16)
WBC #/AREA URNS AUTO: ABNORMAL /HPF

## 2024-01-18 PROCEDURE — 81001 URINALYSIS AUTO W/SCOPE: CPT

## 2024-01-18 PROCEDURE — 36415 COLL VENOUS BLD VENIPUNCTURE: CPT

## 2024-01-18 PROCEDURE — 85025 COMPLETE CBC W/AUTO DIFF WBC: CPT

## 2024-01-26 ENCOUNTER — OFFICE VISIT (OUTPATIENT)
Dept: FAMILY MEDICINE CLINIC | Facility: CLINIC | Age: 39
End: 2024-01-26
Payer: COMMERCIAL

## 2024-01-26 VITALS
SYSTOLIC BLOOD PRESSURE: 98 MMHG | WEIGHT: 131 LBS | OXYGEN SATURATION: 98 % | HEART RATE: 77 BPM | DIASTOLIC BLOOD PRESSURE: 60 MMHG | TEMPERATURE: 99.7 F | HEIGHT: 64 IN | BODY MASS INDEX: 22.36 KG/M2

## 2024-01-26 DIAGNOSIS — R80.8 OTHER PROTEINURIA: ICD-10-CM

## 2024-01-26 DIAGNOSIS — F17.200 TOBACCO USE DISORDER: ICD-10-CM

## 2024-01-26 DIAGNOSIS — D72.828 OTHER ELEVATED WHITE BLOOD CELL (WBC) COUNT: Primary | ICD-10-CM

## 2024-01-26 DIAGNOSIS — Z12.4 SCREENING FOR CERVICAL CANCER: ICD-10-CM

## 2024-01-26 DIAGNOSIS — Z12.31 ENCOUNTER FOR SCREENING MAMMOGRAM FOR MALIGNANT NEOPLASM OF BREAST: ICD-10-CM

## 2024-01-26 PROCEDURE — 99213 OFFICE O/P EST LOW 20 MIN: CPT | Performed by: NURSE PRACTITIONER

## 2024-01-26 NOTE — PROGRESS NOTES
Assessment/Plan:     Presents in office for follow up and lab review   Smoking cessation discussed    Follow up GYN  discussed   Mammogram needed discussed   1. Other elevated white blood cell (WBC) count  Comments:  SMOKER   will discuss smoking cessation  Orders:  -     CBC and differential; Future; Expected date: 04/30/2024  -     Iron Panel (Includes Ferritin, Iron Sat%, Iron, and TIBC); Future; Expected date: 04/30/2024  -     Comprehensive metabolic panel; Future; Expected date: 04/30/2024  -     Sedimentation rate, automated; Future; Expected date: 04/30/2024  -     HOLLY Scr, IFA, W/Refl Titer/Pattern/Rheumatoid Arthritis Panel 1; Future; Expected date: 04/30/2024    2. Other proteinuria  -     CBC and differential; Future; Expected date: 04/30/2024  -     Iron Panel (Includes Ferritin, Iron Sat%, Iron, and TIBC); Future; Expected date: 04/30/2024  -     Comprehensive metabolic panel; Future; Expected date: 04/30/2024  -     Sedimentation rate, automated; Future; Expected date: 04/30/2024  -     HOLLY Scr, IFA, W/Refl Titer/Pattern/Rheumatoid Arthritis Panel 1; Future; Expected date: 04/30/2024    3. Tobacco use disorder  Assessment & Plan:  We discussed smoking cessation today.    Orders:  -     CBC and differential; Future; Expected date: 04/30/2024  -     Iron Panel (Includes Ferritin, Iron Sat%, Iron, and TIBC); Future; Expected date: 04/30/2024    4. Encounter for screening mammogram for malignant neoplasm of breast  -     Mammo screening bilateral w cad; Future; Expected date: 04/26/2024    5. Screening for cervical cancer  -     Ambulatory Referral to Obstetrics / Gynecology; Future        Elevated white blood cells.  We discussed the fact that she is a smoker that could cause elevated white blood cells, so we discussed smoking cessation.    Proteinuria.  She is to hydrate better and increase protein intake.  We will continue to monitor.    Other tests.  I ordered a mammogram bilateral screening.  She was  "referred to OB/GYN for a Pap smear.    Follow-up  We will repeat some of the blood work again at some point in about 6 months follow-up.   She is to get the preventative medicine as we discussed today, and I will see her back for a physical exam.                Subjective:      Patient ID: Ronni Thrasher is a 38 y.o. female who presents in office for lab review, elevated white blood cells, and proteinuria. She has underlying tobacco use and intermittent marijuana use for joint issues and anxiety.     She is due for a mammogram and a follow-up with OB/GYN    The following portions of the patient's history were reviewed and updated as appropriate: allergies, current medications, past family history, past medical history, past social history, past surgical history, and problem list.    Review of Systems  Constitutional: Negative for excessive fatigue, fever.  HENT: Negative for congestion or postnasal dripping.  Pulmonary: Negative for cough, dyspnea.  Cardiovascular: Negative for chest pain, palpitation, or edema.  Gastrointestinal: Negative for abdominal distention, pelvic pain, nausea, or vomiting.  Endocrine: Negative for endocrine issues.  Genitourinary: Negative for urinary problems, flank pain or other complaints.  Musculoskeletal: Positive for generalized aches and muscle fatigue.  Integumentary: Negative for rashes.  Neurologic: Positive for intermittent headaches, but overall doing okay.  Psychological: Positive for underlying anxiety and sleep issues. Negative for suicidal ideations.      Objective:  BP 98/60 (BP Location: Left arm, Patient Position: Sitting, Cuff Size: Adult)   Pulse 77   Temp 99.7 °F (37.6 °C)   Ht 5' 4\"   Wt 59.4 kg (131 lb)   SpO2 98%   BMI 22.49 kg/m²          Physical Exam  Constitutional:       Appearance: She is well-developed.   HENT:      Head: Normocephalic and atraumatic.      Right Ear: Tympanic membrane normal.      Left Ear: Tympanic membrane normal.      Mouth/Throat:      " Pharynx: Uvula midline.     Neck: No neck rigidity or adenopathy.  Eyes:      Pupils: Pupils are equal, round, and reactive to light.   Cardiovascular:      Rate and Rhythm: Normal rate.   Pulmonary:      Lung sounds: No wheezing or rhonchi.     Effort: Pulmonary effort is normal.   Abdominal:      Palpations: Abdomen is soft and nondistended.     Bowel sounds: She has bowel sounds x4.  Musculoskeletal:     General: Range of motion is within normal limits.     Cervical back: Normal range of motion.     Extremities: No edema on bilateral lower extremities.  Skin:     General: Skin is warm.  Mental status: She is alert and oriented x3.  Behavior: Normal mood and behavior.    I have personally reviewed results with the patient.    Laboratory Studies  White blood cells are 10.79 with normal differentials.  Normal H&H and platelets.  Urinalysis shows some trace protein.        Transcribed for JEANIE Navarro, by Charlton Reyes on 01/30/24 at 4:05 PM. Powered by [employer].

## 2024-03-26 NOTE — PROGRESS NOTES
Diagnoses and all orders for this visit:    Encounter for annual routine gynecological examination  -     Liquid-based pap, screening    Encounter for screening mammogram for malignant neoplasm of breast    Screening for cervical cancer  -     Ambulatory Referral to Obstetrics / Gynecology    We have reviewed mechanism of action, benefits, risks, side effects of LARC's & IUD placement.  Advised to schedule for IUD insertion.  Patient has been advised to avoid intercourse for 2 weeks prior to insertion,  patient has been advised to take 600 mg of ibuprofen with food 1 hour prior to the appointment to help diminish cramping post placement.   Advised fertility is restored use condoms for barrier method until new IUD is placed    Perineal hygiene reviewed   Weight bearing exercises minium of 150 mins/weekly advised.   Kegel exercises recommended daily, see AVS for instructions and recommendations  SBE encouraged, ASCCP guidelines reviewed. Condoms encouraged with all sexual activity to prevent STI's.   Gardisil vaccines recommended up to age 45  Calcium/ Vit D dietary requirements discussed,   Advised to call with any issues,  all concerns & questions addressed.   See provided information in your after visit summary     F/U Annually and PRN      Health Maintenance:    Last PAP: 2011 Negative ( [facility])  Next PAP Due:collect today     Last Mammogram: Not on file    Next Mammogram: has script from PCP    Last Colonoscopy: Not on file    advised age 43    Gardisil:  Not completed       Subjective    CC: Yearly Exam , New patient      Ronni Thrasher is a 38 y.o. female here for an annual exam.   GYN hx includes:  1 vaginal , 2 c sections, Leep   No personal Hx of breast, cervical, ovarian or colon CA.   Family hx of:   PGM- colon cancer Unsure,  Mgg aunt  Breast cancer /  Medically stable, reports no changes in medical Hx, follows with PMD  Daily smoker    No LMP recorded (exact date). Patient has had an  implant.  Her menstrual cycles are rare with IUD. She denies issues with bleeding during her menses.  Historically light menstrual cycles  Reports history of abnormal pap smear.LEEP Path neg . No FU since.   She denies breast concerns, abnormal vaginal discharge, vaginal itching, odor, irritation, bowel/bladder dysfunction, urinary symptoms, pelvic pain, or dyspareunia today.   She is sexually active. Monogamous relationship.  Her current method of contraception includes Mirena LNG-IUD (Mirena, Kyleena, Little) since    . Denies any issues with her BCM.  She does not want STD testing today.  Denies intimate partner violence    Children 19,16,12    Cleans air B&B's     Past Medical History:   Diagnosis Date    Abnormal Pap smear of cervix     HPV (human papilloma virus) anogenital infection     Varicella      Past Surgical History:   Procedure Laterality Date    CERVICAL BIOPSY  W/ LOOP ELECTRODE EXCISION       SECTION      x2    CHOLECYSTECTOMY         Immunization History   Administered Date(s) Administered    Rubella 2007    Tdap 2012, 2023       Family History   Problem Relation Age of Onset    No Known Problems Daughter     No Known Problems Son     No Known Problems Son      Social History     Tobacco Use    Smoking status: Every Day     Current packs/day: 1.00     Average packs/day: 1 pack/day for 25.0 years (25.0 ttl pk-yrs)     Types: Cigarettes    Smokeless tobacco: Never   Vaping Use    Vaping status: Every Day    Substances: THC, CBD   Substance Use Topics    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    Drug use: 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, Heroin     Comment: sober for 4 years       Current Outpatient Medications:     mupirocin (BACTROBAN) 2 % ointment, Apply topically 3 (three) times a day for 10 days, Disp: 22 g, Rfl: 0  Patient Active Problem List    Diagnosis Date Noted    History of illicit drug use 2020    History of   "delivery 2012    Hx LEEP (loop electrosurgical excision procedure), cervix, pregnancy 2012    Tobacco use disorder 2011    Papanicolaou smear of cervix with high grade squamous intraepithelial lesion (HGSIL) 2011       Allergies   Allergen Reactions    Codeine Itching     When using tylenol #4         OB History    Para Term  AB Living   3 3       3   SAB IAB Ectopic Multiple Live Births           3      # Outcome Date GA Lbr Alireza/2nd Weight Sex Delivery Anes PTL Lv   3 Para            2 Para            1 Para               Obstetric Comments    1 vaginal delivery & 2  deliveries       Vitals:    24 1252   BP: 128/72   BP Location: Right arm   Patient Position: Sitting   Cuff Size: Large   Weight: 59.4 kg (131 lb)   Height: 5' 4\" (1.626 m)     Body mass index is 22.49 kg/m².    Review of Systems     Constitutional: Negative for chills, fatigue, fever, headaches, visual disturbances, and unexpected weight change.   Respiratory: Negative for cough, & shortness of breath.  Cardiovascular: Negative for chest pain. .    Gastrointestinal: Negative for Abd pain, nausea & vomiting, constipation and diarrhea.   Genitourinary: Negative for difficulty urinating, dysuria, hematuria,  unusual vaginal bleeding or discharge  Skin: Negative skin changes    Physical Exam     Constitutional: Alert & Oriented x3, well-developed and well-nourished. No distress.   HENT: Atraumatic, Normocephalic, Conjunctivae clear  Neck: Normal range of motion. Neck supple. No thyromegaly, mass, nodules or tenderness  Pulmonary: Effort normal.   Abdominal: Soft. No tenderness or masses  Musculoskeletal: Normal ROM  Skin: Warm & Dry  Psychological: Normal mood, thought content, behavior & judgement     Breasts:   Right: tissue soft without masses, tenderness, skin changes or nipple discharge. No areas of erythema or pain. No subclavicular, axillary, pectoral adenopathy  Left:  tissue soft without " masses, tenderness, skin changes or nipple discharge. No areas of erythema or pain. No subclavicular, axillary, pectoral adenopathy    Pelvic exam was performed with patient supine, lithotomy position.      Labia: Negative rash, tenderness, lesion or injury on the right labia.              Negative rash, tenderness, lesion or injury on the left labia.   Urethral meatus:  Negative for  tenderness, inflammation or discharge.   Uterus: not deviated, enlarged, fixed or tender.   Cervix: No CMT, no discharge or friability. IUD strings visible with large dark colored mass attached to strings , appears to be a mat of hair IUD removed at today's visit  Right adnexa: no mass, no tenderness and no fullness.  Left adnexa: no mass, no tenderness and no fullness.   Vagina: No erythema, tenderness, masses, or foreign body in the vagina. No signs of injury around the vagina. No unusual vaginal discharge   Perineum without lesions, signs of injury, erythema or swelling.  Inguinal Canal:        Right: No inguinal adenopathy or hernia present.        Left: No inguinal adenopathy or hernia present.

## 2024-03-26 NOTE — PATIENT INSTRUCTIONS
Intrauterine Device   AMBULATORY CARE:   An IUD  is a type of birth control that is inserted into your uterus. It is a small, flexible piece of plastic with a string on the end. It is inserted and removed by your healthcare provider. IUDs prevent sperm from reaching or fertilizing an egg. IUDs also prevent a fertilized egg from attaching to the uterus and developing into a fetus.       Common types of IUDs:  Your healthcare provider will recommend the type of IUD that is right for you. This is based on your age and if you have had a child. If you have not had a child, a smaller IUD will be used.     A copper IUD  slowly releases a small amount of copper into your uterus. This IUD can remain in place for up to 10 years.    A hormone-releasing IUD  slowly releases a small amount of progesterone into your uterus. Progesterone is a hormone that is made by your body to help control your periods. This IUD can remain in place for 3 to 5 years.  Seek care immediately if:   You have severe pain or bleeding during your period.    You have a fever and severe abdominal pain.    Call your doctor or gynecologist if:   You think you are pregnant.    The IUD has come out.    You have bleeding from your vagina after you have sex, and it is not your period.    You have pain during sex.    You cannot feel the IUD string, the string feels longer, or you feel the plastic of the IUD itself.    You have vaginal discharge that is green, yellow, or has a foul odor.    You have questions or concerns about your condition or care.    Advantages of an IUD:   An IUD is 98% to 99% effective in preventing pregnancy.    The IUD can be removed by your healthcare provider if you decide to have a baby. You may be able to get pregnant as soon as the IUD is removed.    An IUD protects you from pregnancy right after it is inserted.    You do not have to stop sexual activity to insert it. You do not have to remember to take your birth control  pill.    Copper IUDs are safer for some women than oral birth control pills. Examples include women who smoke or have a history of blood clots.    Hormone-releasing IUDs may decrease certain health problems. Examples include bleeding and cramping that happen with your monthly period.    Disadvantages of an IUD:   There is a small chance that you could get pregnant. Sometimes the IUD cannot be removed after you get pregnant. This increases your risk of a miscarriage or an ectopic pregnancy. Ectopic pregnancy is when the fertilized egg starts to grow somewhere other than your uterus.    An IUD does not protect you from sexually transmitted infections.    You may have cramps during the first weeks after you get the IUD.    A copper IUD may cause your monthly period to be heavier or more painful. This is more common within the first 3 months after you get the IUD. You may need to have your IUD removed if your bleeding or pain becomes severe. You may have spotting between periods.    There is a small risk of an infection within the first 20 days after the IUD is placed. Infection can lead to pelvic inflammatory disease. This can cause infertility.    Your uterus may tear when the IUD is inserted. The IUD may slip part or all of the way out of your uterus.    Self-care:   NSAIDs , such as ibuprofen, help decrease swelling, pain, and fever. This medicine is available with or without a doctor's order. NSAIDs can cause stomach bleeding or kidney problems in certain people. If you take blood thinner medicine, always ask if NSAIDs are safe for you. Always read the medicine label and follow directions.    Apply heat to relieve pain and cramping.  Use a heating pad set on low. Apply heat to your lower abdomen for 20 minutes every hour, or as directed.    Return to activities as directed.  Your healthcare provider will tell you when it is okay to return to work, school, or other activities.    Do not use a tampon or have sex   until your provider says it is okay.    Make sure your IUD is in place:  An IUD has a string that is made of plastic thread. One to 2 inches of this string hangs into your vagina. You cannot see this string, and it should not cause problems when you have sex. Check your IUD string every 3 days for the first 3 months that you have your IUD. After that, check the string after each monthly period. Do the following to check the placement of your IUD:  Wash your hands with soap and warm water. Dry them with a clean towel.    Bend your knees and squat low to the ground.    Gently put your index finger inside your vagina. The cervix is at the top of the vagina and feels like the tip of your nose. Feel for the IUD string. Do not pull on the string. You should not be able to feel the firm plastic of the IUD itself.     Wash your hands after you check your IUD string.    For more information:   [facility]  [address] [place] [address]  Phone: [phone]  Web Address: http://www.[facility]org    Follow up with your doctor as directed:  Write down your questions so you remember to ask them during your visits.  © Copyright [employer] 2023 Information is for End User's use only and may not be sold, redistributed or otherwise used for commercial purposes.  The above information is an  only. It is not intended as medical advice for individual conditions or treatments. Talk to your doctor, nurse or pharmacist before following any medical regimen to see if it is safe and effective for you.  Breast Self Exam for Women   AMBULATORY CARE:   A breast self-exam (BSE)  is a way to check your breasts for lumps and other changes. Regular BSEs can help you know how your breasts normally look and feel. Most breast lumps or changes are not cancer, but you should always have them checked by a healthcare provider.  Why you should do a BSE:  Breast cancer is the most common  type of cancer in women. Even if you have mammograms, you may still want to do a BSE regularly. If you know how your breasts normally feel and look, it may help you know when to contact your healthcare provider. Mammograms can miss some cancers. You may find a lump during a BSE that did not show up on a mammogram.  When you should do a BSE:  If you have periods, you may want to do your BSE 1 week after your period ends. This is the time when your breasts may be the least swollen, lumpy, or tender. You can do regular BSEs even if you are breastfeeding or have breast implants.  Call your doctor if:   You find any lumps or changes in your breasts.    You have breast pain or fluid coming from your nipples.    You have questions or concerns about your condition or care.    How to do a BSE:       Look at your breasts in a mirror.  Look at the size and shape of each breast and nipple. Check for swelling, lumps, dimpling, scaly skin, or other skin changes. Look for nipple changes, such as a nipple that is painful or beginning to pull inward. Gently squeeze both nipples and check to see if fluid (that is not breast milk) comes out of them. If you find any of these or other breast changes, contact your healthcare provider. Check your breasts while you sit or  the following 3 positions:    Hang your arms down at your sides.    Raise your hands and join them behind your head.    Put firm pressure with your hands on your hips. Bend slightly forward while you look at your breasts in the mirror.    Lie down and feel your breasts.  When you lie down, your breast tissue spreads out evenly over your chest. This makes it easier for you to feel for lumps and anything that may not be normal for your breasts. Do a BSE on one breast at a time.    Place a small pillow or towel under your left shoulder.  Put your left arm behind your head.    Use the 3 middle fingers of your right hand.  Use your fingertip pads, on the top of your  fingers. Your fingertip pad is the most sensitive part of your finger.    Use small circles to feel your breast tissue.  Use your fingertip pads to make dime-sized, overlapping circles on your breast and armpits. Use light, medium, and firm pressure. First, press lightly. Second, press with medium pressure to feel a little deeper into the breast. Last, use firm pressure to feel deep within your breast.    Examine your entire breast area.  Examine the breast area from above the breast to below the breast where you feel only ribs. Make small circles with your fingertips, starting in the middle of your armpit. Make circles going up and down the breast area. Continue toward your breast and all the way across it. Examine the area from your armpit all the way over to the middle of your chest (breastbone). Stop at the middle of your chest.    Move the pillow or towel to your right shoulder, and put your right arm behind your head.  Use the 3 fingertip pads of your left hand, and repeat the above steps to do a BSE on your right breast.  What else you can do to check for breast problems or cancer:  Talk to your healthcare provider about mammograms. A mammogram is an x-ray of your breasts to screen for breast cancer or other problems. Your provider can tell you the benefits and risks of mammograms. The first mammogram is usually at age 45 or 50. Your provider may recommend you start at 40 or younger if your risk for breast cancer is high. Mammograms usually continue every 1 to 2 years until age 74.       Follow up with your doctor as directed:  Write down your questions so you remember to ask them during your visits.  © Copyright [employer] 2023 Information is for End User's use only and may not be sold, redistributed or otherwise used for commercial purposes.  The above information is an  only. It is not intended as medical advice for individual conditions or treatments. Talk to your doctor, nurse or pharmacist  before following any medical regimen to see if it is safe and effective for you.  Wellness Visit for Adults   AMBULATORY CARE:   A wellness visit  is when you see your healthcare provider to get screened for health problems. Your healthcare provider will also give you advice on how to stay healthy. Write down your questions so you remember to ask them. Ask your healthcare provider how often you should have a wellness visit.  What happens at a wellness visit:  Your healthcare provider will ask about your health, and your family history of health problems. This includes high blood pressure, heart disease, and cancer. He or she will ask if you have symptoms that concern you, if you smoke, and about your mood. You may also be asked about your intake of medicines, supplements, food, and alcohol. Any of the following may be done:  Your weight  will be checked. Your height may also be checked so your body mass index (BMI) can be calculated. Your BMI shows if you are at a healthy weight.    Your blood pressure  and heart rate will be checked. Your temperature may also be checked.    Blood and urine tests  may be done. Blood tests may be done to check your cholesterol levels. Abnormal cholesterol levels increase your risk for heart disease and stroke. You may also need a blood or urine test to check for diabetes if you are at increased risk. Urine tests may be done to look for signs of an infection or kidney disease.    A physical exam  includes checking your heartbeat and lungs with a stethoscope. Your healthcare provider may also check your skin to look for sun damage.    Screening tests  may be recommended. A screening test is done to check for diseases that may not cause symptoms. The screening tests you may need depend on your age, gender, family history, and lifestyle habits. For example, colorectal screening may be recommended if you are 50 years old or older.    Screening tests you need if you are a woman:   A Pap  smear  is used to screen for cervical cancer. Pap smears are usually done every 3 to 5 years depending on your age. You may need them more often if you have had abnormal Pap smear test results in the past. Ask your healthcare provider how often you should have a Pap smear.    A mammogram  is an x-ray of your breasts to screen for breast cancer. Experts recommend mammograms every 2 years starting at age 50 years. You may need a mammogram at age 49 years or younger if you have an increased risk for breast cancer. Talk to your healthcare provider about when you should start having mammograms and how often you need them.    Vaccines you may need:   Get an influenza vaccine  every year. The influenza vaccine protects you from the flu. Several types of viruses cause the flu. The viruses change over time, so new vaccines are made each year.    Get a tetanus-diphtheria (Td) booster vaccine  every 10 years. This vaccine protects you against tetanus and diphtheria. Tetanus is a severe infection that may cause painful muscle spasms and lockjaw. Diphtheria is a severe bacterial infection that causes a thick covering in the back of your mouth and throat.    Get a human papillomavirus (HPV) vaccine  if you are female and aged 19 to 26 or male 19 to 21 and never received it. This vaccine protects you from HPV infection. HPV is the most common infection spread by sexual contact. HPV may also cause vaginal, penile, and anal cancers.    Get a pneumococcal vaccine  if you are aged 65 years or older. The pneumococcal vaccine is an injection given to protect you from pneumococcal disease. Pneumococcal disease is an infection caused by pneumococcal bacteria. The infection may cause pneumonia, meningitis, or an ear infection.    Get a shingles vaccine  if you are 60 or older, even if you have had shingles before. The shingles vaccine is an injection to protect you from the varicella-zoster virus. This is the same virus that causes  chickenpox. Shingles is a painful rash that develops in people who had chickenpox or have been exposed to the virus.    How to eat healthy:  My Plate is a model for planning healthy meals. It shows the types and amounts of foods that should go on your plate. Fruits and vegetables make up about half of your plate, and grains and protein make up the other half. A serving of dairy is included on the side of your plate. The amount of calories and serving sizes you need depends on your age, gender, weight, and height. Examples of healthy foods are listed below:  Eat a variety of vegetables  such as dark green, red, and orange vegetables. You can also include canned vegetables low in sodium (salt) and frozen vegetables without added butter or sauces.    Eat a variety of fresh fruits , canned fruit in 100% juice, frozen fruit, and dried fruit.    Include whole grains.  At least half of the grains you eat should be whole grains. Examples include whole-wheat bread, wheat pasta, brown rice, and whole-grain cereals such as oatmeal.    Eat a variety of protein foods such as seafood (fish and shellfish), lean meat, and poultry without skin (turkey and chicken). Examples of lean meats include pork leg, shoulder, or tenderloin, and beef round, sirloin, tenderloin, and extra lean ground beef. Other protein foods include eggs and egg substitutes, beans, peas, soy products, nuts, and seeds.    Choose low-fat dairy products such as skim or 1% milk or low-fat yogurt, cheese, and cottage cheese.    Limit unhealthy fats  such as butter, hard margarine, and shortening.       Exercise:  Exercise at least 30 minutes per day on most days of the week. Some examples of exercise include walking, biking, dancing, and swimming. You can also fit in more physical activity by taking the stairs instead of the elevator or parking farther away from stores. Include muscle strengthening activities 2 days each week. Regular exercise provides many health  benefits. It helps you manage your weight, and decreases your risk for type 2 diabetes, heart disease, stroke, and high blood pressure. Exercise can also help improve your mood. Ask your healthcare provider about the best exercise plan for you.       General health and safety guidelines:   Do not smoke.  Nicotine and other chemicals in cigarettes and cigars can cause lung damage. Ask your healthcare provider for information if you currently smoke and need help to quit. E-cigarettes or smokeless tobacco still contain nicotine. Talk to your healthcare provider before you use these products.    Limit alcohol.  A drink of alcohol is 12 ounces of beer, 5 ounces of wine, or 1½ ounces of liquor.    Lose weight, if needed.  Being overweight increases your risk of certain health conditions. These include heart disease, high blood pressure, type 2 diabetes, and certain types of cancer.    Protect your skin.  Do not sunbathe or use tanning beds. Use sunscreen with a SPF 15 or higher. Apply sunscreen at least 15 minutes before you go outside. Reapply sunscreen every 2 hours. Wear protective clothing, hats, and sunglasses when you are outside.    Drive safely.  Always wear your seatbelt. Make sure everyone in your car wears a seatbelt. A seatbelt can save your life if you are in an accident. Do not use your cell phone when you are driving. This could distract you and cause an accident. Pull over if you need to make a call or send a text message.    Practice safe sex.  Use latex condoms if are sexually active and have more than one partner. Your healthcare provider may recommend screening tests for sexually transmitted infections (STIs).    Wear helmets, lifejackets, and protective gear.  Always wear a helmet when you ride a bike or motorcycle, go skiing, or play sports that could cause a head injury. Wear protective equipment when you play sports. Wear a lifejacket when you are on a boat or doing water sports.    © Copyright  [employer] 2023 Information is for End User's use only and may not be sold, redistributed or otherwise used for commercial purposes.  The above information is an  only. It is not intended as medical advice for individual conditions or treatments. Talk to your doctor, nurse or pharmacist before following any medical regimen to see if it is safe and effective for you.  Kegel Exercises for Women   AMBULATORY CARE:   Kegel exercises  help strengthen your pelvic muscles. Pelvic muscles hold your pelvic organs, such as your bladder and uterus, in place. Kegel exercises help prevent or control certain conditions, such as urine incontinence (leakage) or uterine prolapse.       Call your doctor or physical therapist if:   You cannot feel your muscles tighten or relax.    You continue to leak urine.    You have questions or concerns about your condition or care.    Use the correct muscles:  Pelvic muscles are the muscles you use to control urine flow. To target these muscles, stop and start the flow of urine several times. This will help you become familiar with how it feels to tighten and relax these muscles.  How to do Kegel exercises:   Get into a comfortable position.  You may lie down, stand up, or sit down to do these exercises. When you first try to do these exercises, it may be easier if you lie down.    Tighten or squeeze your pelvic muscles slowly.  It may feel like you are trying to hold back urine or gas. Hold this position for 3 seconds. Relax for 3 seconds. Repeat this cycle 10 times. Do not hold your breath when you do Kegel exercises. Keep your stomach, back, and leg muscles relaxed.    Do 10 sets of Kegel exercises, at least 3 times a day.  When you know how to do Kegel exercises, use different positions. This will help to strengthen your pelvic muscles as much as possible. You can do these exercises while you lie on the floor, watch TV, or while you stand. Tighten your pelvic muscles before you  sneeze, cough, or lift to prevent urine leakage. You may notice improved bladder control within about 6 weeks.    Follow up with your doctor or physical therapist as directed:  Write down your questions so you remember to ask them during your visits.  © Copyright [employer] 2023 Information is for End User's use only and may not be sold, redistributed or otherwise used for commercial purposes.  The above information is an  only. It is not intended as medical advice for individual conditions or treatments. Talk to your doctor, nurse or pharmacist before following any medical regimen to see if it is safe and effective for you.       Perineal Hygiene      Your vaginal naturally takes care of its self, it is a self washing system, the less you mess the healthier it will be     No soaps or feminine wash to the vulva, these products can cause dermitis, bacterial infections and other vulvar problems.   Use only water to cleanse, or water with Dove or Dove Sensitive Skin Bar soap if necessary.    No scented lotions or products are advised in or near your vulva.    Use only coconut oil for moisture if needed.  No douching this may cause imbalance in your vaginal PH and further issues.    If you wear panty liners, you may apply a thin coating of Vaseline, A&D ointment or coconut oil to the vulvar tissues as a skin barrier     Cotton underware, loose fitting clothing  Only perfume-free, dye-free laundry detergent, use a second rinse cycle   Avoid fabric softeners/dryer sheets.       Your partner should avoid the same products as well.       Over the counter probiotic to restore vaginal darwin may be helpful as well, take daily.       You may also look into Boric Acid vaginal suppositories to restore vaginal PH balance for up to 2 weeks as directed on the box. You may not use these if you are pregnant      For vaginal dryness:      You may use:     Coconut oil (organic, pure, unscented) as needed for moisture or  lubrication. ( Do not use if allergic)       Replens moisture restore external comfort gel daily ( use as directed on the box)        Replens long lasting vaginal moisturizer  ( use as directed on the box)         For Vaginal Lubrication:          You may use:     Coconut oil (organic, pure, unscented) as a lubricant or another scent-free lubricant (Astroglide, Uberlube) if needed.  Do not use coconut oil or silicone if using a condom as this may break down the integrity of the condom and cause an unplanned pregnancy              Do not use coconut oil if allergic               Replens silky smooth lubricant, premium silicone based lubricant for intercourse. ( use as directed, a small amount will provide an enhanced natural feeling)     Any premium over the counter vaginal lubricant water or silicone based. Silicone based will have more staying power.

## 2024-03-27 ENCOUNTER — OFFICE VISIT (OUTPATIENT)
Dept: OBGYN CLINIC | Facility: CLINIC | Age: 39
End: 2024-03-27
Payer: COMMERCIAL

## 2024-03-27 VITALS
DIASTOLIC BLOOD PRESSURE: 72 MMHG | SYSTOLIC BLOOD PRESSURE: 128 MMHG | BODY MASS INDEX: 22.36 KG/M2 | HEIGHT: 64 IN | WEIGHT: 131 LBS

## 2024-03-27 DIAGNOSIS — Z12.31 ENCOUNTER FOR SCREENING MAMMOGRAM FOR MALIGNANT NEOPLASM OF BREAST: ICD-10-CM

## 2024-03-27 DIAGNOSIS — Z12.4 SCREENING FOR CERVICAL CANCER: ICD-10-CM

## 2024-03-27 DIAGNOSIS — Z30.432 ENCOUNTER FOR IUD REMOVAL: ICD-10-CM

## 2024-03-27 DIAGNOSIS — Z01.419 ENCOUNTER FOR ANNUAL ROUTINE GYNECOLOGICAL EXAMINATION: Primary | ICD-10-CM

## 2024-03-27 PROCEDURE — G0145 SCR C/V CYTO,THINLAYER,RESCR: HCPCS | Performed by: OBSTETRICS & GYNECOLOGY

## 2024-03-27 PROCEDURE — G0476 HPV COMBO ASSAY CA SCREEN: HCPCS | Performed by: OBSTETRICS & GYNECOLOGY

## 2024-03-27 PROCEDURE — 58301 REMOVE INTRAUTERINE DEVICE: CPT | Performed by: OBSTETRICS & GYNECOLOGY

## 2024-03-27 PROCEDURE — 99386 PREV VISIT NEW AGE 40-64: CPT | Performed by: OBSTETRICS & GYNECOLOGY

## 2024-03-27 NOTE — PROGRESS NOTES
"Iud removal    Date/Time: 3/27/2024 1:00 PM    Performed by: JEANIE Barry  Authorized by: JEANIE Barry  Universal Protocol:  Procedure performed by:  Consent: Verbal consent obtained. Written consent not obtained.  Risks and benefits: risks, benefits and alternatives were discussed  Consent given by: patient  Time out: Immediately prior to procedure a \"time out\" was called to verify the correct patient, procedure, equipment, support staff and site/side marked as required.  Patient understanding: patient states understanding of the procedure being performed  Patient consent: the patient's understanding of the procedure matches consent given  Procedure consent: procedure consent matches procedure scheduled  Relevant documents: relevant documents present and verified  Site marked: the operative site was not marked  Radiology Images displayed and confirmed. If images not available, report reviewed: imaging studies not available  Patient identity confirmed: verbally with patient    Procedure:     Removed with no complications: yes      Removal due to mechanical complications of IUD: no      Removal due to infection and inflammatory reaction: no      Other reason for removal:   , in place 2012  Comments:      IUD removed without difficulty, device intact, device observed by patient. Large mass of black hair wrapped around IUD strings   Hemostasis appreciated post removal  Monitor for signs and symptoms of infection, return to the office with any concerns.        "

## 2024-03-29 LAB
HPV HR 12 DNA CVX QL NAA+PROBE: NEGATIVE
HPV16 DNA CVX QL NAA+PROBE: NEGATIVE
HPV18 DNA CVX QL NAA+PROBE: NEGATIVE

## 2024-04-09 LAB
LAB AP GYN PRIMARY INTERPRETATION: NORMAL
Lab: NORMAL
PATH INTERP SPEC-IMP: NORMAL

## 2024-05-23 ENCOUNTER — VBI (OUTPATIENT)
Dept: ADMINISTRATIVE | Facility: OTHER | Age: 39
End: 2024-05-23

## 2024-05-23 NOTE — TELEPHONE ENCOUNTER
05/23/24 11:50 AM     [employer] SmartForm used to document caregap status.    Cristela Rosado MA

## 2024-06-12 ENCOUNTER — ANNUAL EXAM (OUTPATIENT)
Dept: OBGYN CLINIC | Facility: CLINIC | Age: 39
End: 2024-06-12
Payer: COMMERCIAL

## 2024-06-12 VITALS
BODY MASS INDEX: 21.17 KG/M2 | WEIGHT: 124 LBS | HEIGHT: 64 IN | DIASTOLIC BLOOD PRESSURE: 74 MMHG | SYSTOLIC BLOOD PRESSURE: 110 MMHG

## 2024-06-12 DIAGNOSIS — Z30.430 ENCOUNTER FOR IUD INSERTION: Primary | ICD-10-CM

## 2024-06-12 DIAGNOSIS — Z30.011 ENCOUNTER FOR INITIAL PRESCRIPTION OF CONTRACEPTIVE PILLS: ICD-10-CM

## 2024-06-12 DIAGNOSIS — Z11.3 SCREENING FOR STDS (SEXUALLY TRANSMITTED DISEASES): ICD-10-CM

## 2024-06-12 DIAGNOSIS — Z53.8 UNSUCCESSFUL IUD INSERTION: ICD-10-CM

## 2024-06-12 DIAGNOSIS — Z32.02 NEGATIVE PREGNANCY TEST: ICD-10-CM

## 2024-06-12 LAB — SL AMB POCT URINE HCG: NORMAL

## 2024-06-12 PROCEDURE — 58300 INSERT INTRAUTERINE DEVICE: CPT | Performed by: OBSTETRICS & GYNECOLOGY

## 2024-06-12 PROCEDURE — 81025 URINE PREGNANCY TEST: CPT | Performed by: OBSTETRICS & GYNECOLOGY

## 2024-06-12 PROCEDURE — 99214 OFFICE O/P EST MOD 30 MIN: CPT | Performed by: OBSTETRICS & GYNECOLOGY

## 2024-06-12 PROCEDURE — 87491 CHLMYD TRACH DNA AMP PROBE: CPT | Performed by: OBSTETRICS & GYNECOLOGY

## 2024-06-12 PROCEDURE — 87591 N.GONORRHOEAE DNA AMP PROB: CPT | Performed by: OBSTETRICS & GYNECOLOGY

## 2024-06-12 RX ORDER — ACETAMINOPHEN AND CODEINE PHOSPHATE 120; 12 MG/5ML; MG/5ML
1 SOLUTION ORAL DAILY
Qty: 90 TABLET | Refills: 3 | Status: SHIPPED | OUTPATIENT
Start: 2024-06-12

## 2024-06-12 NOTE — PATIENT INSTRUCTIONS
Intrauterine Device   AMBULATORY CARE:   An IUD  is a type of birth control that is inserted into your uterus. It is a small, flexible piece of plastic with a string on the end. It is inserted and removed by your healthcare provider. IUDs prevent sperm from reaching or fertilizing an egg. IUDs also prevent a fertilized egg from attaching to the uterus and developing into a fetus.       Common types of IUDs:  Your healthcare provider will recommend the type of IUD that is right for you. This is based on your age and if you have had a child. If you have not had a child, a smaller IUD will be used.     A copper IUD  slowly releases a small amount of copper into your uterus. This IUD can remain in place for up to 10 years.    A hormone-releasing IUD  slowly releases a small amount of progesterone into your uterus. Progesterone is a hormone that is made by your body to help control your periods. This IUD can remain in place for 3 to 5 years.  Seek care immediately if:   You have severe pain or bleeding during your period.    You have a fever and severe abdominal pain.    Call your doctor or gynecologist if:   You think you are pregnant.    The IUD has come out.    You have bleeding from your vagina after you have sex, and it is not your period.    You have pain during sex.    You cannot feel the IUD string, the string feels longer, or you feel the plastic of the IUD itself.    You have vaginal discharge that is green, yellow, or has a foul odor.    You have questions or concerns about your condition or care.    Advantages of an IUD:   An IUD is 98% to 99% effective in preventing pregnancy.    The IUD can be removed by your healthcare provider if you decide to have a baby. You may be able to get pregnant as soon as the IUD is removed.    An IUD protects you from pregnancy right after it is inserted.    You do not have to stop sexual activity to insert it. You do not have to remember to take your birth control  pill.    Copper IUDs are safer for some women than oral birth control pills. Examples include women who smoke or have a history of blood clots.    Hormone-releasing IUDs may decrease certain health problems. Examples include bleeding and cramping that happen with your monthly period.    Disadvantages of an IUD:   There is a small chance that you could get pregnant. Sometimes the IUD cannot be removed after you get pregnant. This increases your risk of a miscarriage or an ectopic pregnancy. Ectopic pregnancy is when the fertilized egg starts to grow somewhere other than your uterus.    An IUD does not protect you from sexually transmitted infections.    You may have cramps during the first weeks after you get the IUD.    A copper IUD may cause your monthly period to be heavier or more painful. This is more common within the first 3 months after you get the IUD. You may need to have your IUD removed if your bleeding or pain becomes severe. You may have spotting between periods.    There is a small risk of an infection within the first 20 days after the IUD is placed. Infection can lead to pelvic inflammatory disease. This can cause infertility.    Your uterus may tear when the IUD is inserted. The IUD may slip part or all of the way out of your uterus.    Self-care:   NSAIDs , such as ibuprofen, help decrease swelling, pain, and fever. This medicine is available with or without a doctor's order. NSAIDs can cause stomach bleeding or kidney problems in certain people. If you take blood thinner medicine, always ask if NSAIDs are safe for you. Always read the medicine label and follow directions.    Apply heat to relieve pain and cramping.  Use a heating pad set on low. Apply heat to your lower abdomen for 20 minutes every hour, or as directed.    Return to activities as directed.  Your healthcare provider will tell you when it is okay to return to work, school, or other activities.    Do not use a tampon or have sex   until your provider says it is okay.    Make sure your IUD is in place:  An IUD has a string that is made of plastic thread. One to 2 inches of this string hangs into your vagina. You cannot see this string, and it should not cause problems when you have sex. Check your IUD string every 3 days for the first 3 months that you have your IUD. After that, check the string after each monthly period. Do the following to check the placement of your IUD:  Wash your hands with soap and warm water. Dry them with a clean towel.    Bend your knees and squat low to the ground.    Gently put your index finger inside your vagina. The cervix is at the top of the vagina and feels like the tip of your nose. Feel for the IUD string. Do not pull on the string. You should not be able to feel the firm plastic of the IUD itself.     Wash your hands after you check your IUD string.    For more information:   [facility] [place] [address]  Phone: [phone]  Web Address: [email or web address]    Follow up with your doctor as directed:  Write down your questions so you remember to ask them during your visits.  © Copyright [employer] 2023 Information is for End User's use only and may not be sold, redistributed or otherwise used for commercial purposes.  The above information is an  only. It is not intended as medical advice for individual conditions or treatments. Talk to your doctor, nurse or pharmacist before following any medical regimen to see if it is safe and effective for you.

## 2024-06-12 NOTE — PROGRESS NOTES
Iud insertions    Performed by: JEANIE Barry  Authorized by: JEANIE Barry    Procedure: IUD insertion    Consent obtained by patient, parent, or legal power of  - including discussion of procedure risks and benefits, patient questions answered, and patient education provided.: yes    Pregnancy risk: reasonably certain the patient is not pregnant    Pre-Medications:  Ibuprofen  Immediately prior to procedure a time out was called: yes    Pelvic exam performed: yes    Speculum placed in vagina: yes    Cervix cleaned and prepped: yes    Tenaculum/Allis/Ring Forceps applied to cervix: yes    Anesthesia used: yes    Local anesthesia:  Topical  Local anesthetic:  Lidocaine  Uterus sound depth (cm):  9  Cervix dilated: no    Patient tolerated procedure well: no    Inserted with ultrasound guidance: no    Transvaginal sono confirmed fundal placement: no    Estimated blood loss (mL):  0   Unsuccessful insertion pt unable to tolerate procedure   Uterus retroverted and tilted to her left

## 2024-06-12 NOTE — PROGRESS NOTES
Diagnoses and all orders for this visit:    Encounter for IUD insertion  -     Iud insertions    Negative pregnancy test  -     POCT urine HCG  -     norethindrone (MICRONOR) 0.35 MG tablet; Take 1 tablet (0.35 mg total) by mouth daily    Encounter for initial prescription of contraceptive pills  -     norethindrone (MICRONOR) 0.35 MG tablet; Take 1 tablet (0.35 mg total) by mouth daily    Screening for STDs (sexually transmitted diseases)  -     Chlamydia/GC amplified DNA by PCR    Unsuccessful IUD insertion    Unsuccessful attempt for IUD insertion     She would like to start on POP, Medical and family hx reviewed for risk of VTE. Discussed benefits, side effects and risks of POP use. Reviewed ACHES. Printed information given & reviewed instructions for how to take the pill .  Return to office with any concerns   See after visit summary for further information and recommendations to the above mentioned subjects which we may or may not have covered in detail during your visit     Subjective    CC: IUD insertion      Ronni Thrasher is a 38 y.o. female   Unable to insert IUD at today's visit.  Patient may reschedule in the future.  Progesterone only pills prescribed for birth control.  Reviewed risk factors, benefits, side effects, instructions for use    Patient's last menstrual period was 2024 (exact date).    Past Medical History:   Diagnosis Date    Abnormal Pap smear of cervix     HPV (human papilloma virus) anogenital infection     Varicella      Past Surgical History:   Procedure Laterality Date    CERVICAL BIOPSY  W/ LOOP ELECTRODE EXCISION       SECTION      x2    CHOLECYSTECTOMY         Immunization History   Administered Date(s) Administered    Rubella 2007    Tdap 2012, 2023       Family History   Problem Relation Age of Onset    No Known Problems Daughter     No Known Problems Son     No Known Problems Son      Social History     Tobacco Use    Smoking status:  "Every Day     Current packs/day: 1.00     Average packs/day: 1 pack/day for 25.0 years (25.0 ttl pk-yrs)     Types: Cigarettes    Smokeless tobacco: Never   Vaping Use    Vaping status: Every Day    Substances: THC, CBD   Substance Use Topics    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    Drug use: 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, Heroin     Comment: sober for 4 years       Current Outpatient Medications:     norethindrone (MICRONOR) 0.35 MG tablet, Take 1 tablet (0.35 mg total) by mouth daily, Disp: 90 tablet, Rfl: 3    mupirocin (BACTROBAN) 2 % ointment, Apply topically 3 (three) times a day for 10 days, Disp: 22 g, Rfl: 0  Patient Active Problem List    Diagnosis Date Noted    History of illicit drug use 2020    History of  delivery 2012    Hx LEEP (loop electrosurgical excision procedure), cervix, pregnancy 2012    Tobacco use disorder 2011    Papanicolaou smear of cervix with high grade squamous intraepithelial lesion (HGSIL) 2011       Allergies   Allergen Reactions    Codeine Itching     When using tylenol #4         OB History    Para Term  AB Living   3 3       3   SAB IAB Ectopic Multiple Live Births           3      # Outcome Date GA Lbr Alireza/2nd Weight Sex Type Anes PTL Lv   3 Para            2 Para            1 Para               Obstetric Comments    1 vaginal delivery & 2  deliveries       Vitals:    24 1139   BP: 110/74   BP Location: Left arm   Patient Position: Sitting   Cuff Size: Large   Weight: 56.2 kg (124 lb)   Height: 5' 4\" (1.626 m)     Body mass index is 21.28 kg/m².    Review of Systems     Constitutional: Negative for chills, fatigue, fever, headaches, visual disturbances, and unexpected weight change.   Respiratory: Negative for cough, & shortness of breath.  Cardiovascular: Negative for chest pain. .    Gastrointestinal: Negative for Abd pain, nausea & vomiting, constipation and diarrhea. "   Genitourinary: Negative for difficulty urinating, dysuria, hematuria, dyspareunia, unusual vaginal bleeding or discharge  Skin: Negative skin changes    Physical Exam     Constitutional: Alert & Oriented x3, well-developed and well-nourished. No distress.   HENT: Atraumatic, Normocephalic,   Neck: Normal range of motion.   Pulmonary: Effort normal.   Abdominal: Soft. No tenderness or masses  Musculoskeletal: Normal ROM  Skin: Warm & Dry  Psychological: Normal mood, thought content, behavior & judgement     Pelvic exam was performed with patient supine, lithotomy position.      Labia: Negative rash, tenderness, lesion or injury on the right labia.              Negative rash, tenderness, lesion or injury on the left labia.   Urethral meatus:  Negative for  tenderness, inflammation or discharge.   Uterus: not deviated, enlarged, fixed or tender. Retroverted   Cervix: No CMT, no discharge or friability.   Right adnexa: no mass, no tenderness and no fullness.  Left adnexa: no mass, no tenderness and no fullness.   Vagina: No erythema, tenderness, masses, or foreign body in the vagina. No signs of injury around the vagina. No unusual vaginal discharge   Perineum without lesions, signs of injury, erythema or swelling.  Inguinal Canal:        Right: No inguinal adenopathy or hernia present.        Left: No inguinal adenopathy or hernia present.       I have spent a total time of 40 minutes on 06/12/24 in caring for this patient including Risks and benefits of tx options, Instructions for management, Patient and family education, Importance of tx compliance, Counseling / Coordination of care, Documenting in the medical record, and Obtaining or reviewing history  .

## 2024-06-13 LAB
C TRACH DNA SPEC QL NAA+PROBE: NEGATIVE
N GONORRHOEA DNA SPEC QL NAA+PROBE: NEGATIVE

## 2024-07-10 ENCOUNTER — LAB (OUTPATIENT)
Dept: LAB | Facility: CLINIC | Age: 39
End: 2024-07-10
Payer: COMMERCIAL

## 2024-07-10 DIAGNOSIS — F17.200 TOBACCO USE DISORDER: ICD-10-CM

## 2024-07-10 DIAGNOSIS — R80.8 OTHER PROTEINURIA: ICD-10-CM

## 2024-07-10 DIAGNOSIS — D72.828 OTHER ELEVATED WHITE BLOOD CELL (WBC) COUNT: ICD-10-CM

## 2024-07-10 LAB
ALBUMIN SERPL BCG-MCNC: 3.9 G/DL (ref 3.5–5)
ALP SERPL-CCNC: 50 U/L (ref 34–104)
ALT SERPL W P-5'-P-CCNC: 6 U/L (ref 7–52)
ANION GAP SERPL CALCULATED.3IONS-SCNC: 9 MMOL/L (ref 4–13)
AST SERPL W P-5'-P-CCNC: 12 U/L (ref 13–39)
BASOPHILS # BLD AUTO: 0.1 THOUSANDS/ÂΜL (ref 0–0.1)
BASOPHILS NFR BLD AUTO: 1 % (ref 0–1)
BILIRUB SERPL-MCNC: 0.54 MG/DL (ref 0.2–1)
BUN SERPL-MCNC: 16 MG/DL (ref 5–25)
CALCIUM SERPL-MCNC: 8.6 MG/DL (ref 8.4–10.2)
CHLORIDE SERPL-SCNC: 110 MMOL/L (ref 96–108)
CO2 SERPL-SCNC: 20 MMOL/L (ref 21–32)
CREAT SERPL-MCNC: 0.77 MG/DL (ref 0.6–1.3)
EOSINOPHIL # BLD AUTO: 0.31 THOUSAND/ÂΜL (ref 0–0.61)
EOSINOPHIL NFR BLD AUTO: 2 % (ref 0–6)
ERYTHROCYTE [DISTWIDTH] IN BLOOD BY AUTOMATED COUNT: 13.1 % (ref 11.6–15.1)
ERYTHROCYTE [SEDIMENTATION RATE] IN BLOOD: 4 MM/HOUR (ref 0–19)
FERRITIN SERPL-MCNC: 106 NG/ML (ref 11–307)
GFR SERPL CREATININE-BSD FRML MDRD: 96 ML/MIN/1.73SQ M
GLUCOSE SERPL-MCNC: 86 MG/DL (ref 65–140)
HCT VFR BLD AUTO: 40.3 % (ref 34.8–46.1)
HGB BLD-MCNC: 13.5 G/DL (ref 11.5–15.4)
IMM GRANULOCYTES # BLD AUTO: 0.06 THOUSAND/UL (ref 0–0.2)
IMM GRANULOCYTES NFR BLD AUTO: 1 % (ref 0–2)
IRON SATN MFR SERPL: 27 % (ref 15–50)
IRON SERPL-MCNC: 76 UG/DL (ref 50–212)
LYMPHOCYTES # BLD AUTO: 3.06 THOUSANDS/ÂΜL (ref 0.6–4.47)
LYMPHOCYTES NFR BLD AUTO: 23 % (ref 14–44)
MCH RBC QN AUTO: 31.1 PG (ref 26.8–34.3)
MCHC RBC AUTO-ENTMCNC: 33.5 G/DL (ref 31.4–37.4)
MCV RBC AUTO: 93 FL (ref 82–98)
MONOCYTES # BLD AUTO: 0.94 THOUSAND/ÂΜL (ref 0.17–1.22)
MONOCYTES NFR BLD AUTO: 7 % (ref 4–12)
NEUTROPHILS # BLD AUTO: 8.61 THOUSANDS/ÂΜL (ref 1.85–7.62)
NEUTS SEG NFR BLD AUTO: 66 % (ref 43–75)
NRBC BLD AUTO-RTO: 0 /100 WBCS
PLATELET # BLD AUTO: 270 THOUSANDS/UL (ref 149–390)
PMV BLD AUTO: 11.7 FL (ref 8.9–12.7)
POTASSIUM SERPL-SCNC: 4.2 MMOL/L (ref 3.5–5.3)
PROT SERPL-MCNC: 6.6 G/DL (ref 6.4–8.4)
RBC # BLD AUTO: 4.34 MILLION/UL (ref 3.81–5.12)
SODIUM SERPL-SCNC: 139 MMOL/L (ref 135–147)
TIBC SERPL-MCNC: 283 UG/DL (ref 250–450)
UIBC SERPL-MCNC: 207 UG/DL (ref 155–355)
WBC # BLD AUTO: 13.08 THOUSAND/UL (ref 4.31–10.16)

## 2024-07-10 PROCEDURE — 86431 RHEUMATOID FACTOR QUANT: CPT | Performed by: NURSE PRACTITIONER

## 2024-07-10 PROCEDURE — 83540 ASSAY OF IRON: CPT

## 2024-07-10 PROCEDURE — 36415 COLL VENOUS BLD VENIPUNCTURE: CPT

## 2024-07-10 PROCEDURE — 86038 ANTINUCLEAR ANTIBODIES: CPT

## 2024-07-10 PROCEDURE — 80053 COMPREHEN METABOLIC PANEL: CPT

## 2024-07-10 PROCEDURE — 85652 RBC SED RATE AUTOMATED: CPT

## 2024-07-10 PROCEDURE — 85025 COMPLETE CBC W/AUTO DIFF WBC: CPT

## 2024-07-10 PROCEDURE — 86200 CCP ANTIBODY: CPT | Performed by: NURSE PRACTITIONER

## 2024-07-10 PROCEDURE — 82728 ASSAY OF FERRITIN: CPT

## 2024-07-10 PROCEDURE — 83550 IRON BINDING TEST: CPT

## 2024-07-12 DIAGNOSIS — D72.829 LEUKOCYTOSIS, UNSPECIFIED TYPE: Primary | ICD-10-CM

## 2024-07-12 NOTE — RESULT ENCOUNTER NOTE
Will discuss at follow up however for now adding hematology follow up for elevated WHITE BLOOD CELLS

## 2024-07-19 LAB — MISCELLANEOUS LAB TEST RESULT: NORMAL

## 2024-07-31 ENCOUNTER — OFFICE VISIT (OUTPATIENT)
Dept: FAMILY MEDICINE CLINIC | Facility: CLINIC | Age: 39
End: 2024-07-31
Payer: COMMERCIAL

## 2024-07-31 VITALS
HEART RATE: 78 BPM | WEIGHT: 123 LBS | OXYGEN SATURATION: 99 % | TEMPERATURE: 98.7 F | HEIGHT: 64 IN | SYSTOLIC BLOOD PRESSURE: 118 MMHG | BODY MASS INDEX: 21 KG/M2 | DIASTOLIC BLOOD PRESSURE: 80 MMHG

## 2024-07-31 DIAGNOSIS — Z00.00 ANNUAL PHYSICAL EXAM: Primary | ICD-10-CM

## 2024-07-31 DIAGNOSIS — K76.89 DECREASED LIVER FUNCTION: ICD-10-CM

## 2024-07-31 DIAGNOSIS — Z00.00 ANNUAL PHYSICAL EXAM: ICD-10-CM

## 2024-07-31 DIAGNOSIS — F17.200 TOBACCO USE DISORDER: ICD-10-CM

## 2024-07-31 DIAGNOSIS — D72.828 OTHER ELEVATED WHITE BLOOD CELL (WBC) COUNT: ICD-10-CM

## 2024-07-31 DIAGNOSIS — E55.9 VITAMIN D DEFICIENCY: ICD-10-CM

## 2024-07-31 PROCEDURE — 99396 PREV VISIT EST AGE 40-64: CPT | Performed by: NURSE PRACTITIONER

## 2024-07-31 NOTE — PROGRESS NOTES
Adult Annual Physical  Name: Ronni Thrasher      : 1985      MRN: [id number]  Encounter Provider: JEANIE Navarro  Encounter Date: 2024   Encounter department: [facility]    Assessment & Plan   1. Annual physical exam  Comments:  HOLLY normal   labs reviewed  healthy diet   exercise   adeqaute hydration  2. Tobacco use disorder  3. Other elevated white blood cell (WBC) count  Comments:  she has follow up with Hematology  Orders:  -     CBC and differential; Future; Expected date: 2024  -     Comprehensive metabolic panel; Future; Expected date: 2024  -     Vitamin D 25 hydroxy; Future; Expected date: 2024  4. Decreased liver function  -     CBC and differential; Future; Expected date: 2024  -     Comprehensive metabolic panel; Future; Expected date: 2024  5. Vitamin D deficiency  -     Vitamin D 25 hydroxy; Future; Expected date: 2024  6. Annual physical exam    Immunizations and preventive care screenings were discussed with patient today. Appropriate education was printed on patient's after visit summary.    Counseling:  Alcohol/drug use: discussed moderation in alcohol intake, the recommendations for healthy alcohol use, and avoidance of illicit drug use.  Dental Health: discussed importance of regular tooth brushing, flossing, and dental visits.  Injury prevention: discussed safety/seat belts, safety helmets, smoke detectors, carbon dioxide detectors, and smoking near bedding or upholstery.  Sexual health: discussed sexually transmitted diseases, partner selection, use of condoms, avoidance of unintended pregnancy, and contraceptive alternatives.  Exercise: the importance of regular exercise/physical activity was discussed. Recommend exercise 3-5 times per week for at least 30 minutes.       Depression Screening and Follow-up Plan: Patient was screened for depression during today's encounter. They screened negative with a PHQ-2 score  of 0.    Tobacco Cessation Counseling: Tobacco cessation counseling was provided. The patient is sincerely urged to quit consumption of tobacco. She is not ready to quit tobacco. Medication options and side effects of medication discussed.         History of Present Illness     Adult Annual Physical:  Patient presents for annual physical. Pt is a 38 yr old female   Presents in office for annual wellness and follow up on labs   Denies any issues today .     Diet and Physical Activity:  - Diet/Nutrition: well balanced diet.  - Exercise: walking.    Depression Screening:  - PHQ-2 Score: 0    General Health:  - Sleep: sleeps well.  - Hearing: normal hearing bilateral ears.  - Vision: no vision problems.    /GYN Health:  - Follows with GYN: yes.   - History of STDs: no    Advanced Care Planning:  - Has an advanced directive?: no    - Has a durable medical POA?: no    - ACP document given to patient?: no      Review of Systems   Constitutional:  Negative for chills, fatigue, fever and unexpected weight change.   HENT:  Negative for congestion, rhinorrhea, sore throat and voice change.    Eyes: Negative.    Respiratory:  Negative for cough and shortness of breath.         Smoker    Cardiovascular:  Negative for chest pain and palpitations.   Gastrointestinal:  Negative for abdominal distention, abdominal pain, nausea and vomiting.   Endocrine: Negative.    Genitourinary:  Negative for difficulty urinating, flank pain and frequency.   Musculoskeletal:  Negative for arthralgias and myalgias.   Skin:  Negative for rash.   Allergic/Immunologic: Negative for environmental allergies.   Neurological:  Negative for headaches.   Hematological:  Negative for adenopathy.        Elevated WBC    Psychiatric/Behavioral:  Negative for sleep disturbance and suicidal ideas. The patient is not nervous/anxious.      Pertinent Medical History   Reviewed       Medical History Reviewed by provider this encounter:  Tobacco  Allergies  Meds   "Problems  Med Hx  Surg Hx  Fam Hx       Past Medical History   Past Medical History:   Diagnosis Date    Abnormal Pap smear of cervix     HPV (human papilloma virus) anogenital infection     Varicella      Past Surgical History:   Procedure Laterality Date    CERVICAL BIOPSY  W/ LOOP ELECTRODE EXCISION  2012     SECTION      x2    CHOLECYSTECTOMY  2007     Family History   Problem Relation Age of Onset    No Known Problems Daughter     No Known Problems Son     No Known Problems Son      Current Outpatient Medications on File Prior to Visit   Medication Sig Dispense Refill    mupirocin (BACTROBAN) 2 % ointment Apply topically 3 (three) times a day for 10 days 22 g 0     No current facility-administered medications on file prior to visit.     Allergies   Allergen Reactions    Codeine Itching     When using tylenol #4        Current Outpatient Medications on File Prior to Visit   Medication Sig Dispense Refill    mupirocin (BACTROBAN) 2 % ointment Apply topically 3 (three) times a day for 10 days 22 g 0     No current facility-administered medications on file prior to visit.      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   Constitutional:       Appearance: Normal " appearance.   HENT:      Head: Atraumatic.      Nose: No congestion or rhinorrhea.   Eyes:      Extraocular Movements: Extraocular movements intact.   Cardiovascular:      Rate and Rhythm: Normal rate and regular rhythm.      Pulses: Normal pulses.      Heart sounds: Normal heart sounds.   Pulmonary:      Effort: Pulmonary effort is normal.   Abdominal:      Palpations: Abdomen is soft.   Musculoskeletal:      Cervical back: Normal range of motion.      Right lower leg: No edema.      Left lower leg: No edema.   Skin:     General: Skin is warm.   Neurological:      Mental Status: She is alert and oriented to person, place, and time.   Psychiatric:         Mood and Affect: Mood normal.         Behavior: Behavior normal.     Contains abnormal data CBC and differential  Order: [id number]   Status: Final result       Visible to patient: Yes (seen)       Next appt: 09/06/2024 at 09:20 AM in Hematology and Oncology (Shanta Saleh PA-C)       Dx: Tobacco use disorder; Other proteinur...    3 Result Notes       2 Patient Communications        Component  Ref Range & Units 7/10/24 12:37 PM 1/18/24  1:47 PM 7/13/23 10:53 AM   WBC  4.31 - 10.16 Thousand/uL 13.08 High  10.79 High  11.28 High    RBC  3.81 - 5.12 Million/uL 4.34 4.64 4.67   Hemoglobin  11.5 - 15.4 g/dL 13.5 14.0 14.3   Hematocrit  34.8 - 46.1 % 40.3 42.1 43.3   MCV  82 - 98 fL 93 91 93   MCH  26.8 - 34.3 pg 31.1 30.2 30.6   MCHC  31.4 - 37.4 g/dL 33.5 33.3 33.0   RDW  11.6 - 15.1 % 13.1 12.9 12.8   MPV  8.9 - 12.7 fL 11.7 11.1 11.6   Platelets  149 - 390 Thousands/uL 270 285 288   nRBC  /100 WBCs 0 0 0   Segmented %  43 - 75 % 66 51 60   Immature Grans %  0 - 2 % 1 0 0   Lymphocytes %  14 - 44 % 23 38 28   Monocytes %  4 - 12 % 7 8 8   Eosinophils Relative  0 - 6 % 2 2 3   Basophils Relative  0 - 1 % 1 1 1   Absolute Neutrophils  1.85 - 7.62 Thousands/µL 8.61 High  5.51 6.72   Absolute Immature Grans  0.00 - 0.20 Thousand/uL 0.06 0.03 0.05   Absolute  Lymphocytes  0.60 - 4.47 Thousands/µL 3.06 4.09 3.19   Absolute Monocytes  0.17 - 1.22 Thousand/µL 0.94 0.81 0.88   Eosinophils Absolute  0.00 - 0.61 Thousand/µL 0.31 0.26 0.35   Basophils Absolute  0.00 - 0.10 Thousands/µL 0.10 0.09 0.09              Specimen Collected: 07/10/24 12:37 PM Last Resulted: 07/10/24  6:33 PM         TIBC Panel (incl. Iron, TIBC, % Iron Saturation)  Order: [id number] - Part of Panel Order [id number]   Status: Final result       Visible to patient: Yes (seen)       Next appt: 09/06/2024 at 09:20 AM in Hematology and Oncology (Shanta Saleh PA-C)       Dx: Tobacco use disorder; Other proteinur...    0 Result Notes      Component  Ref Range & Units 7/10/24 12:37 PM   Iron Saturation  15 - 50 % 27   TIBC  250 - 450 ug/dL 283   Iron  50 - 212 ug/dL 76   Comment: Patients treated with metal-binding drugs (ie. Deferoxamine) may have depressed iron values.   UIBC  155 - 355 ug/dL 207              Specimen Collected: 07/10/24 12:37 PM Last Resulted: 07/10/24 11:41 PM         Contains abnormal data Comprehensive metabolic panel  Order: [id number]   Status: Final result       Visible to patient: Yes (seen)       Next appt: 09/06/2024 at 09:20 AM in Hematology and Oncology (Shanta Saleh PA-C)       Dx: Other proteinuria; Other elevated whi...    3 Result Notes       2 Patient Communications       Component  Ref Range & Units 7/10/24 12:37 PM 7/13/23 10:53 AM   Sodium  135 - 147 mmol/L 139 140   Potassium  3.5 - 5.3 mmol/L 4.2 4.1   Chloride  96 - 108 mmol/L 110 High  113 High    CO2  21 - 32 mmol/L 20 Low  21   ANION GAP  4 - 13 mmol/L 9 6 R   BUN  5 - 25 mg/dL 16 14   Creatinine  0.60 - 1.30 mg/dL 0.77 0.83 CM   Comment: Standardized to IDMS reference method   Glucose  65 - 140 mg/dL 86    Comment: If the patient is fasting, the ADA then defines impaired fasting glucose as > 100 mg/dL and diabetes as > or equal to 123 mg/dL.   Calcium  8.4 - 10.2 mg/dL 8.6 8.8 R   AST  13 -  39 U/L 12 Low  12 R, CM   ALT  7 - 52 U/L 6 Low  12 R, CM   Comment: Specimen collection should occur prior to Sulfasalazine administration due to the potential for falsely depressed results.   Alkaline Phosphatase  34 - 104 U/L 50 56 R   Total Protein  6.4 - 8.4 g/dL 6.6 7.1   Albumin  3.5 - 5.0 g/dL 3.9 3.7   Total Bilirubin  0.20 - 1.00 mg/dL 0.54 0.63 CM        Administrative Statements   I have spent a total time of 25  minutes in caring for this patient on the day of the visit/encounter including Prognosis, Risks and benefits of tx options, Instructions for management, Patient and family education, Importance of tx compliance, Risk factor reductions, Impressions, Counseling / Coordination of care, Documenting in the medical record, Reviewing / ordering tests, medicine, procedures  , Obtaining or reviewing history  , and Communicating with other healthcare professionals .

## 2024-07-31 NOTE — PATIENT INSTRUCTIONS
"Patient Education     Routine physical for adults   The Basics   Written by the doctors and editors at [facility]   What is a physical? -- A physical is a routine visit, or \"check-up,\" with your doctor. You might also hear it called a \"wellness visit\" or \"preventive visit.\"  During each visit, the doctor will:   Ask about your physical and mental health   Ask about your habits, behaviors, and lifestyle   Do an exam   Give you vaccines if needed   Talk to you about any medicines you take   Give advice about your health   Answer your questions  Getting regular check-ups is an important part of taking care of your health. It can help your doctor find and treat any problems you have. But it's also important for preventing health problems.  A routine physical is different from a \"sick visit.\" A sick visit is when you see a doctor because of a health concern or problem. Since physicals are scheduled ahead of time, you can think about what you want to ask the doctor.  How often should I get a physical? -- It depends on your age and health. In general, for people age 21 years and older:   If you are younger than 50 years, you might be able to get a physical every 3 years.   If you are 50 years or older, your doctor might recommend a physical every year.  If you have an ongoing health condition, like diabetes or high blood pressure, your doctor will probably want to see you more often.  What happens during a physical? -- In general, each visit will include:   Physical exam - The doctor or nurse will check your height, weight, heart rate, and blood pressure. They will also look at your eyes and ears. They will ask about how you are feeling and whether you have any symptoms that bother you.   Medicines - It's a good idea to bring a list of all the medicines you take to each doctor visit. Your doctor will talk to you about your medicines and answer any questions. Tell them if you are having any side effects that bother you. You " "should also tell them if you are having trouble paying for any of your medicines.   Habits and behaviors - This includes:   Your diet   Your exercise habits   Whether you smoke, drink alcohol, or use drugs   Whether you are sexually active   Whether you feel safe at home  Your doctor will talk to you about things you can do to improve your health and lower your risk of health problems. They will also offer help and support. For example, if you want to quit smoking, they can give you advice and might prescribe medicines. If you want to improve your diet or get more physical activity, they can help you with this, too.   Lab tests, if needed - The tests you get will depend on your age and situation. For example, your doctor might want to check your:   Cholesterol   Blood sugar   Iron level   Vaccines - The recommended vaccines will depend on your age, health, and what vaccines you already had. Vaccines are very important because they can prevent certain serious or deadly infections.   Discussion of screening - \"Screening\" means checking for diseases or other health problems before they cause symptoms. Your doctor can recommend screening based on your age, risk, and preferences. This might include tests to check for:   Cancer, such as breast, prostate, cervical, ovarian, colorectal, prostate, lung, or skin cancer   Sexually transmitted infections, such as chlamydia and gonorrhea   Mental health conditions like depression and anxiety  Your doctor will talk to you about the different types of screening tests. They can help you decide which screenings to have. They can also explain what the results might mean.   Answering questions - The physical is a good time to ask the doctor or nurse questions about your health. If needed, they can refer you to other doctors or specialists, too.  Adults older than 65 years often need other care, too. As you get older, your doctor will talk to you about:   How to prevent falling at " home   Hearing or vision tests   Memory testing   How to take your medicines safely   Making sure that you have the help and support you need at home  All topics are updated as new evidence becomes available and our peer review process is complete.  This topic retrieved from [employer] on: May 02, 2024.  Topic 138401 Version 1.0  Release: 32.4.3 - C32.122  © 2024 UpToDate, Inc. and/or its affiliates. All rights reserved.  Consumer Information Use and Disclaimer   Disclaimer: This generalized information is a limited summary of diagnosis, treatment, and/or medication information. It is not meant to be comprehensive and should be used as a tool to help the user understand and/or assess potential diagnostic and treatment options. It does NOT include all information about conditions, treatments, medications, side effects, or risks that may apply to a specific patient. It is not intended to be medical advice or a substitute for the medical advice, diagnosis, or treatment of a health care provider based on the health care provider's examination and assessment of a patient's specific and unique circumstances. Patients must speak with a health care provider for complete information about their health, medical questions, and treatment options, including any risks or benefits regarding use of medications. This information does not endorse any treatments or medications as safe, effective, or approved for treating a specific patient. UpToDate, Inc. and its affiliates disclaim any warranty or liability relating to this information or the use thereof.The use of this information is governed by the Terms of Use, available at https://www.w[employer]ters[employer]uwer.com/en/know/clinical-effectiveness-terms. 2024© UpToDate, Inc. and its affiliates and/or licensors. All rights reserved.  Copyright   © 2024 UpToDate, Inc. and/or its affiliates. All rights reserved.    Patient Education     Heart Healthy Diet   General   With a heart healthy food plan,  you will learn to make better food choices. This diet may help you lower your blood cholesterol level, manage your blood pressure, and lower your risk for heart problems. Smaller portions may also be helpful.  Sodium is a type of mineral found in many foods. It helps keep the balance of fluids in your body. Too much sodium can raise your blood pressure. It can also make you take on extra water. This is called edema. Pay careful attention to how much salt or sodium is in your food. You may need to avoid salt or eat foods with less sodium.  Cholesterol is a fat-like, waxy substance in your blood. It is normal to have some cholesterol in your blood because your body makes it. You also get extra cholesterol from all animal products. These are foods like meats, eggs, and dairy products. Too much cholesterol in your blood can block or damage your blood vessels. This can lead to a heart attack or stroke.  Fats in your food have calories which give energy. Not all fats are bad. Some fats are healthy, like the fat found in fish, nuts, and olive oil. These are called unsaturated fats. They help manage body functions and lower cholesterol levels. Learn about the best fats to use in your diet and where to use them. Eating too much fat may make you more likely to weigh more than is healthy. This raises your risk of many heart problems.  Fiber is found in plants. Meat and dairy products do not have fiber in them. Fiber can help you lower your unhealthy cholesterol level. You may need more water as you eat more fiber so you do not get hard stools.  What lifestyle changes are needed?   Eat a healthy diet and workout often. Try to use as many calories as you take in each day.  What changes to diet are needed?   Eat oily fish at least 2 times a week. These are fish like tuna, salmon, and mackerel.  Limit sodium to no more than 2,300 mg of sodium per day. This is about 1 teaspoon (5 grams) of table salt. Use little or no salt when  making food. Try other spices or seasoning instead.  Limit how much cholesterol you eat to less than 300 mg per day. You can do this by having lean meats. Also eat lots of fruits, vegetables, and fat-free and low-fat dairy products.  Limit how much trans fats you eat. Trans fats are found in many processed foods like stick margarine, shortening, and some fried foods. Also, lower how much hydrogenated fats you eat. They are used to make pastries, biscuits, cookies, crackers, chips, and many snack foods.  Have no more than 1 drink per day of beer, wine, and mixed drinks (alcohol).  Who should use this diet?   A heart healthy diet is good for everyone.  What foods are good to eat?   Grains: Try to eat 6 to 8 servings of whole grain, high fiber foods each day. These are whole grain bread, cereals, brown rice, or pasta.  Fruits and vegetables: Eat 4 to 5 servings each day. Try to pick many kinds and colors. Try to eat more that are fresh or frozen. Look for low sodium or salt-free if you choose canned. Rinse canned items before cooking or eating. Dried peas, beans, and lentils are also good.  Dairy: Choose low fat (1%) or fat-free milk. Eat nonfat or low-fat products.  Protein: Try to eat more low fat or lean meats like chicken and turkey. Eat less red meat and eat more fish, eggs, egg whites, and beans instead.  Fats: Use good fats found in fish, nuts, and avocados. Try using olive oil, canola oil, and low-sodium and low-fat salad dressing and mayonnaise. Use corn, safflower, sunflower, and soybean oils.  Condiments: Use low-sodium or salt-free broths, soups, soy sauce, and condiments. Pepper, herbs, spices, vinegar, lemon or lime juices are great for seasoning. Sugar, cocoa powder, honey, syrup, and jams may be eaten in small amounts.  Sweets: Low-fat, trans fat-free cookies, cakes, and pies; martín crackers; animal crackers; low-fat fig bars; and alpa snaps.     What foods should be limited or avoided?   Grains:  Salted breads, rolls, crackers, quick breads, self-rising flours, biscuit mixes, regular bread crumbs, instant hot cereals, commercially-prepared rice, pasta, stuffing mixes  Fruits and vegetables: Commercially-prepared potatoes and vegetable mixes, regular canned vegetables and juices, vegetables frozen with sauce or pickled vegetables, processed fruits with added sugar or salt  Dairy: Whole milk, malted milk, chocolate milk, buttermilk  Protein: Smoked, cured, salted, or canned meat, fish, or poultry such as ha and sausages  Fats: Cut back on solid fats like butter, lard, and margarine.  Condiments and snacks: Salted and canned peas, beans, and olives; salted snack foods; fried foods; soda, juices, or other sweetened drinks; commercially-softened water. Miso, salsa, ketchup, barbecue sauce, Worcestershire sauce, soy sauce, and teriyaki sauce are also high in salt.  Sweets: High-fat baked goods such as muffins, donuts, pastries, commercial baked goods  Helpful tips   When you go to a grocery store, have a list or a meal plan. Do not shop when you are hungry to avoid cravings for foods.  You need to know about the sodium and fat content of the food you eat. Read food labels with care. They will show you how much of each is in a serving. This amount is given as a percentage of the total amount you need each day. Reading the labels will help you make healthy food choices.     Avoid fast foods.  Watch your portions when eating out. Split an order or bring home half for another meal.     Talk to a dietitian for help.  Last Reviewed Date   2020-03-27  Consumer Information Use and Disclaimer   This generalized information is a limited summary of diagnosis, treatment, and/or medication information. It is not meant to be comprehensive and should be used as a tool to help the user understand and/or assess potential diagnostic and treatment options. It does NOT include all information about conditions, treatments,  medications, side effects, or risks that may apply to a specific patient. It is not intended to be medical advice or a substitute for the medical advice, diagnosis, or treatment of a health care provider based on the health care provider's examination and assessment of a patient’s specific and unique circumstances. Patients must speak with a health care provider for complete information about their health, medical questions, and treatment options, including any risks or benefits regarding use of medications. This information does not endorse any treatments or medications as safe, effective, or approved for treating a specific patient. UpToDate, Inc. and its affiliates disclaim any warranty or liability relating to this information or the use thereof. The use of this information is governed by the Terms of Use, available at https://www.wolters[employer]uwer.com/en/know/clinical-effectiveness-terms   Copyright   Copyright © 2024 UpToDate, Inc. and its affiliates and/or licensors. All rights reserved.

## 2024-09-06 ENCOUNTER — OFFICE VISIT (OUTPATIENT)
Dept: HEMATOLOGY ONCOLOGY | Facility: CLINIC | Age: 39
End: 2024-09-06
Payer: COMMERCIAL

## 2024-09-06 VITALS
SYSTOLIC BLOOD PRESSURE: 110 MMHG | RESPIRATION RATE: 18 BRPM | HEIGHT: 64 IN | WEIGHT: 128 LBS | TEMPERATURE: 98.3 F | OXYGEN SATURATION: 99 % | BODY MASS INDEX: 21.85 KG/M2 | DIASTOLIC BLOOD PRESSURE: 72 MMHG | HEART RATE: 67 BPM

## 2024-09-06 DIAGNOSIS — D72.829 LEUKOCYTOSIS, UNSPECIFIED TYPE: Primary | ICD-10-CM

## 2024-09-06 PROCEDURE — 99203 OFFICE O/P NEW LOW 30 MIN: CPT | Performed by: PHYSICIAN ASSISTANT

## 2024-09-06 NOTE — PROGRESS NOTES
"[facility] [address] [address]  Hematology Ambulatory Consult  Ronni Thrasher, 1985, [id number]  9/6/2024      Assessment and Plan   1. Leukocytosis, unspecified type  - Ambulatory Referral to Hematology / Oncology  - CBC and differential; Future  - Peripheral Smear; Future  - Lyme Total AB W Reflex to IGM/IGG; Future    38-year-old female with current tobacco use who is seen in consultation for leukocytosis.  Limited data available on chart review however it does appear that she has had a persistent leukocytosis with wbc ranging from 11-13k for at least 1 year without development of cytopenias or constitutional symptoms.  Most recent labs 07/10/01501 WBC 13.0, hemoglobin 13.5 g/dL, MCV 93, platelets 270,000.  Differential shows a mild neutrophilia with ANC 8.61 otherwise normal. She has had intermittent leukocytosis in previous years which were related to pregnancy.  Her only complaint today is anxiety, otherwise she states \"I feel the best that I have ever felt.\"    Discussion/Plan   We discussed the possible etiologies of leukocytosis including primary leukocytosis which entails myeloproliferative disorders and secondary leukocytosis which is more common and seen with infections, inflammation, vasculitis, autoimmune disease, medication etc. Smoking will also increase the white count.  Suspect leukocytosis secondary to chronic tobacco use.   We will continue observation with repeat CBC, peripheral smear in approximately 3 months.    [place], we will also evaluate for Lyme's disease.  Smoking cessation advised.  Follow-up with PCP for tobacco cessation management and resources.   Recommended to begin annual breast cancer screening via mammogram.  Already ordered by PCP.  RTC 3 months  Patient was advised to notify our office if she is experiencing any unexplained fevers, night sweats, unintentional " weight loss, lymphadenopathy or significant fatigue.    Patient voiced agreement and understanding to the above.   Patient knows to call the Hematology/Oncology office with any questions and concerns regarding the above.    Barrier(s) to care: None.    Shanta Saleh PA-C  Medical Oncology/Hematology  [facility]    Subjective   No chief complaint on file.      Referring provider    JEANIE Navarro  [address] [facility]  [place] [address]    History of present illness: 38-year-old female with history of tobacco use, HGSIL s/p LEEP who has been referred by her PCP for evaluation of leukocytosis.    Limited data available for review in EMR.  She has had a persistent leukocytosis since at least July 2023 with WBC ranging from 11-13,000 with mild neutrophilia ANC 8.6. Remainder of CBC normal. On review of outside hospital records, she was noted to have leukocytosis in 2004, 2007 in 2012 with WBC ranging from 11-19,000. She reports these labs were taken during each of her 3 pregnancies.  She had CBC in 2008 and 2011 that showed normal WBC count.  No known history of COPD, asthma, steroid use or autoimmune disease. struggling with anxiety, struggling with grieve with loss of mother, prior partner. she had intermittent chronic dental pain, afraid to go to dentist. Recent HIV and hepatitis C testing from 07/2023 were normal.  Recent HOLLY and RF were also normal.    She smokes 1.5ppd. she wants to quit.   Former alcohol use and opoid use.     No personal hx of cancer. Paternal grandmother had colon cancer. Maternal aunt has breast cancer     Review of Systems   Constitutional:  Negative for chills, fatigue, fever and unexpected weight change.        Drenching sweats beginning of summer x 2 or 3 night 2-3m ago, none since. Happened right after IUD was removed.    Respiratory:  Negative for cough and shortness of breath.    Cardiovascular:  Negative for chest pain (intermittent, poking pain).    Gastrointestinal:  Positive for nausea (intermittent). Negative for abdominal pain, blood in stool and vomiting.        No melena   Genitourinary:  Positive for vaginal bleeding (normal). Negative for hematuria.   Skin:  Negative for rash.   Hematological:  Negative for adenopathy. Does not bruise/bleed easily.   All other systems reviewed and are negative.      Past Medical History:   Diagnosis Date    Abnormal Pap smear of cervix     HPV (human papilloma virus) anogenital infection     Varicella      Past Surgical History:   Procedure Laterality Date    CERVICAL BIOPSY  W/ LOOP ELECTRODE EXCISION  2012     SECTION      x2    CHOLECYSTECTOMY  2007     Family History   Problem Relation Age of Onset    No Known Problems Daughter     No Known Problems Son     No Known Problems Son      Social History     Socioeconomic History    Marital status: Single     Spouse name: Not on file    Number of children: Not on file    Years of education: Not on file    Highest education level: Not on file   Occupational History    Not on file   Tobacco Use    Smoking status: Every Day     Current packs/day: 1.00     Average packs/day: 1 pack/day for 25.0 years (25.0 ttl pk-yrs)     Types: Cigarettes    Smokeless tobacco: Never   Vaping Use    Vaping status: Every Day    Substances: THC, CBD   Substance and Sexual Activity    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 (Adult - for ages 18 years and over): Not on file   Intimate Partner Violence: Not on file   Housing Stability: Not on file         Current Outpatient Medications:     mupirocin (BACTROBAN) 2 % ointment, Apply topically 3 (three) times a day for 10 days, Disp: 22 g, Rfl: 0  Allergies   Allergen Reactions    Codeine Itching     When using tylenol #4         Objective   There were no vitals taken for this visit.  Physical Exam  Vitals reviewed.   HENT:      Head: Normocephalic.   Cardiovascular:      Rate and Rhythm: Normal rate and regular rhythm.   Pulmonary:      Effort: Pulmonary effort is normal.      Breath sounds: Normal breath sounds.   Abdominal:      Palpations: Abdomen is soft.      Tenderness: There is no abdominal tenderness.   Musculoskeletal:      Cervical back: Neck supple.   Lymphadenopathy:      Cervical: No cervical adenopathy.      Upper Body:      Right upper body: No supraclavicular or axillary adenopathy.      Left upper body: No supraclavicular or axillary adenopathy.   Skin:     Findings: No rash.   Neurological:      Mental Status: She is alert.         Result Review  Labs:  No visits with results within 30 Day(s) from this visit.   Latest known visit with results is:   Lab on 07/10/2024   Component Date Value Ref Range Status    WBC 07/10/2024 13.08 (H)  4.31 - 10.16 Thousand/uL Final    RBC 07/10/2024 4.34  3.81 - 5.12 Million/uL Final    Hemoglobin 07/10/2024 13.5  11.5 - 15.4 g/dL Final    Hematocrit 07/10/2024 40.3  34.8 - 46.1 % Final    MCV 07/10/2024 93  82 - 98 fL Final    MCH 07/10/2024 31.1  26.8 - 34.3 pg Final    MCHC 07/10/2024 33.5  31.4 - 37.4 g/dL Final    RDW 07/10/2024 13.1  11.6 - 15.1 % Final    MPV 07/10/2024 11.7  8.9 - 12.7 fL Final    Platelets 07/10/2024 270  149 - 390 Thousands/uL Final    nRBC 07/10/2024 0  /100 WBCs Final    Segmented % 07/10/2024 66  43 - 75 % Final    Immature Grans % 07/10/2024 1  0 - 2 % Final     Lymphocytes % 07/10/2024 23  14 - 44 % Final    Monocytes % 07/10/2024 7  4 - 12 % Final    Eosinophils Relative 07/10/2024 2  0 - 6 % Final    Basophils Relative 07/10/2024 1  0 - 1 % Final    Absolute Neutrophils 07/10/2024 8.61 (H)  1.85 - 7.62 Thousands/µL Final    Absolute Immature Grans 07/10/2024 0.06  0.00 - 0.20 Thousand/uL Final    Absolute Lymphocytes 07/10/2024 3.06  0.60 - 4.47 Thousands/µL Final    Absolute Monocytes 07/10/2024 0.94  0.17 - 1.22 Thousand/µL Final    Eosinophils Absolute 07/10/2024 0.31  0.00 - 0.61 Thousand/µL Final    Basophils Absolute 07/10/2024 0.10  0.00 - 0.10 Thousands/µL Final    Sodium 07/10/2024 139  135 - 147 mmol/L Final    Potassium 07/10/2024 4.2  3.5 - 5.3 mmol/L Final    Chloride 07/10/2024 110 (H)  96 - 108 mmol/L Final    CO2 07/10/2024 20 (L)  21 - 32 mmol/L Final    ANION GAP 07/10/2024 9  4 - 13 mmol/L Final    BUN 07/10/2024 16  5 - 25 mg/dL Final    Creatinine 07/10/2024 0.77  0.60 - 1.30 mg/dL Final    Standardized to IDMS reference method    Glucose 07/10/2024 86  65 - 140 mg/dL Final    If the patient is fasting, the ADA then defines impaired fasting glucose as > 100 mg/dL and diabetes as > or equal to 123 mg/dL.    Calcium 07/10/2024 8.6  8.4 - 10.2 mg/dL Final    AST 07/10/2024 12 (L)  13 - 39 U/L Final    ALT 07/10/2024 6 (L)  7 - 52 U/L Final    Specimen collection should occur prior to Sulfasalazine administration due to the potential for falsely depressed results.     Alkaline Phosphatase 07/10/2024 50  34 - 104 U/L Final    Total Protein 07/10/2024 6.6  6.4 - 8.4 g/dL Final    Albumin 07/10/2024 3.9  3.5 - 5.0 g/dL Final    Total Bilirubin 07/10/2024 0.54  0.20 - 1.00 mg/dL Final    Use of this assay is not recommended for patients undergoing treatment with eltrombopag due to the potential for falsely elevated results.  N-acetyl-p-benzoquinone imine (metabolite of Acetaminophen) will generate erroneously low results in samples for patients that have  taken an overdose of Acetaminophen.    eGFR 07/10/2024 96  ml/min/1.73sq m Final    Sed Rate 07/10/2024 4  0 - 19 mm/hour Final    Miscellaneous Lab Test Result 07/10/2024 see written report   Final    Iron Saturation 07/10/2024 27  15 - 50 % Final    TIBC 07/10/2024 283  250 - 450 ug/dL Final    Iron 07/10/2024 76  50 - 212 ug/dL Final    Patients treated with metal-binding drugs (ie. Deferoxamine) may have depressed iron values.    UIBC 07/10/2024 207  155 - 355 ug/dL Final    Ferritin 07/10/2024 106  11 - 307 ng/mL Final       Imaging:   No relevant imaging available for review  Please note:  This report has been generated by a voice recognition software system. Therefore there may be syntax, spelling, and/or grammatical errors. Please call if you have any questions.

## 2025-01-14 ENCOUNTER — APPOINTMENT (OUTPATIENT)
Dept: LAB | Facility: CLINIC | Age: 40
End: 2025-01-14
Payer: COMMERCIAL

## 2025-01-14 DIAGNOSIS — E55.9 VITAMIN D DEFICIENCY: ICD-10-CM

## 2025-01-14 DIAGNOSIS — K76.89 DECREASED LIVER FUNCTION: ICD-10-CM

## 2025-01-14 DIAGNOSIS — D72.828 OTHER ELEVATED WHITE BLOOD CELL (WBC) COUNT: ICD-10-CM

## 2025-01-14 DIAGNOSIS — D72.829 LEUKOCYTOSIS, UNSPECIFIED TYPE: ICD-10-CM

## 2025-01-14 LAB
25(OH)D3 SERPL-MCNC: 10.2 NG/ML (ref 30–100)
ALBUMIN SERPL BCG-MCNC: 4.1 G/DL (ref 3.5–5)
ALP SERPL-CCNC: 53 U/L (ref 34–104)
ALT SERPL W P-5'-P-CCNC: 12 U/L (ref 7–52)
ANION GAP SERPL CALCULATED.3IONS-SCNC: 6 MMOL/L (ref 4–13)
AST SERPL W P-5'-P-CCNC: 15 U/L (ref 13–39)
BASOPHILS # BLD AUTO: 0.09 THOUSAND/UL (ref 0–0.1)
BASOPHILS NFR MAR MANUAL: 1 % (ref 0–1)
BILIRUB SERPL-MCNC: 0.41 MG/DL (ref 0.2–1)
BUN SERPL-MCNC: 18 MG/DL (ref 5–25)
CALCIUM SERPL-MCNC: 8.7 MG/DL (ref 8.4–10.2)
CHLORIDE SERPL-SCNC: 105 MMOL/L (ref 96–108)
CO2 SERPL-SCNC: 27 MMOL/L (ref 21–32)
CREAT SERPL-MCNC: 0.77 MG/DL (ref 0.6–1.3)
EOSINOPHIL # BLD AUTO: 0.62 THOUSAND/UL (ref 0–0.61)
EOSINOPHIL NFR BLD MANUAL: 7 % (ref 0–6)
ERYTHROCYTE [DISTWIDTH] IN BLOOD BY AUTOMATED COUNT: 13.1 % (ref 11.6–15.1)
GFR SERPL CREATININE-BSD FRML MDRD: 96 ML/MIN/1.73SQ M
GLUCOSE P FAST SERPL-MCNC: 102 MG/DL (ref 65–99)
HCT VFR BLD AUTO: 45.3 % (ref 34.8–46.1)
HGB BLD-MCNC: 14.4 G/DL (ref 11.5–15.4)
LYMPHOCYTES # BLD AUTO: 3.1 THOUSAND/UL (ref 0.6–4.47)
LYMPHOCYTES # BLD AUTO: 35 %
MCH RBC QN AUTO: 30.5 PG (ref 26.8–34.3)
MCHC RBC AUTO-ENTMCNC: 31.8 G/DL (ref 31.4–37.4)
MCV RBC AUTO: 96 FL (ref 82–98)
MONOCYTES # BLD AUTO: 0.8 THOUSAND/UL (ref 0–1.22)
MONOCYTES NFR BLD AUTO: 9 % (ref 4–12)
NEUTS BAND NFR BLD MANUAL: 5 % (ref 0–8)
NEUTS SEG # BLD: 4.25 THOUSAND/UL (ref 1.81–6.82)
NEUTS SEG NFR BLD AUTO: 43 %
NRBC BLD AUTO-RTO: 0 /100 WBCS
PLATELET # BLD AUTO: 294 THOUSANDS/UL (ref 149–390)
PLATELET BLD QL SMEAR: ADEQUATE
PMV BLD AUTO: 11.3 FL (ref 8.9–12.7)
POTASSIUM SERPL-SCNC: 4.1 MMOL/L (ref 3.5–5.3)
PROT SERPL-MCNC: 6.8 G/DL (ref 6.4–8.4)
RBC # BLD AUTO: 4.72 MILLION/UL (ref 3.81–5.12)
RBC MORPH BLD: NORMAL
SODIUM SERPL-SCNC: 138 MMOL/L (ref 135–147)
TOTAL CELLS COUNTED SPEC: 100
WBC # BLD AUTO: 8.86 THOUSAND/UL (ref 4.31–10.16)

## 2025-01-14 PROCEDURE — 82306 VITAMIN D 25 HYDROXY: CPT

## 2025-01-14 PROCEDURE — 80053 COMPREHEN METABOLIC PANEL: CPT

## 2025-01-14 PROCEDURE — 36415 COLL VENOUS BLD VENIPUNCTURE: CPT

## 2025-01-14 PROCEDURE — 85007 BL SMEAR W/DIFF WBC COUNT: CPT

## 2025-01-14 PROCEDURE — 86618 LYME DISEASE ANTIBODY: CPT

## 2025-01-15 ENCOUNTER — RESULTS FOLLOW-UP (OUTPATIENT)
Dept: HEMATOLOGY ONCOLOGY | Facility: CLINIC | Age: 40
End: 2025-01-15

## 2025-01-15 ENCOUNTER — RESULTS FOLLOW-UP (OUTPATIENT)
Dept: FAMILY MEDICINE CLINIC | Facility: CLINIC | Age: 40
End: 2025-01-15

## 2025-01-15 DIAGNOSIS — E55.9 VITAMIN D DEFICIENCY: Primary | ICD-10-CM

## 2025-01-15 LAB — B BURGDOR IGG+IGM SER QL IA: NEGATIVE

## 2025-01-15 RX ORDER — ERGOCALCIFEROL 1.25 MG/1
50000 CAPSULE, LIQUID FILLED ORAL WEEKLY
Qty: 12 CAPSULE | Refills: 0 | Status: SHIPPED | OUTPATIENT
Start: 2025-01-15 | End: 2025-04-15

## 2025-01-15 NOTE — RESULT ENCOUNTER NOTE
Vitamin D is extremely low   Sending high dose of vitamin D   Rest of the labs thus far look ok   Will discuss at follow up

## 2025-01-27 ENCOUNTER — OFFICE VISIT (OUTPATIENT)
Dept: FAMILY MEDICINE CLINIC | Facility: CLINIC | Age: 40
End: 2025-01-27
Payer: COMMERCIAL

## 2025-01-27 VITALS
HEART RATE: 101 BPM | TEMPERATURE: 97.8 F | HEIGHT: 64 IN | BODY MASS INDEX: 22.2 KG/M2 | OXYGEN SATURATION: 98 % | DIASTOLIC BLOOD PRESSURE: 78 MMHG | SYSTOLIC BLOOD PRESSURE: 120 MMHG | WEIGHT: 130 LBS

## 2025-01-27 DIAGNOSIS — F41.9 ANXIETY: ICD-10-CM

## 2025-01-27 DIAGNOSIS — E55.9 VITAMIN D DEFICIENCY: Primary | ICD-10-CM

## 2025-01-27 DIAGNOSIS — D72.829 LEUKOCYTOSIS, UNSPECIFIED TYPE: ICD-10-CM

## 2025-01-27 PROCEDURE — 99214 OFFICE O/P EST MOD 30 MIN: CPT | Performed by: NURSE PRACTITIONER

## 2025-02-03 NOTE — PROGRESS NOTES
Name: Ronni Thrasher      : 1985      MRN: [id number]  Encounter Provider: JEANIE Navarro  Encounter Date: 2025   Encounter department: [facility]  :  Assessment & Plan  Vitamin D deficiency  Supplements discussed   Orders:    TSH, 3rd generation with Free T4 reflex    CBC and differential    Comprehensive metabolic panel    Vitamin D 25 hydroxy    Leukocytosis, unspecified type  Has follow up with hematology   Could be related to smoking over time / absorption issues   Will follow up at next visit   Orders:    TSH, 3rd generation with Free T4 reflex    CBC and differential    Comprehensive metabolic panel    Anxiety  Add magnesium  Discussed affects of long term marijuana intake and affects of anxiety  / rebound anxiety concerns   Discussed reduction   Stress reduction   Discussed current anxiety management  Discussed medications and reportable signs and symptoms  Discussed stress reduction  Incorporate 20 minutes walk deep breathing yoga  And continue follow-up with therapy especially if the medications are not working as planned  If any suicidal homicidal ideation or any panic attacks please go to the ER     Orders:    Magnesium 400 MG CAPS; Take 1 capsule (400 mg total) by mouth in the morning          Depression Screening and Follow-up Plan: Patient was screened for depression during today's encounter. They screened negative with a PHQ-2 score of 0.    Tobacco Cessation Counseling: Tobacco cessation counseling was provided. The patient is sincerely urged to quit consumption of tobacco. She is not ready to quit tobacco. Strongly recommended smoking cessation       History of Present Illness   Pt is a 39 yr old female   Presents in office for 6 month follow up on multiple diagnoses and lab review  Lack of appetite   Discussed follow up with GI   Vitamin D def --> still discussed importance of supplements and concerns with possible absorption   WBC abnormalities - has  "follow up with hematology      Review of Systems   Constitutional:  Negative for chills, fatigue, fever and unexpected weight change.   HENT:  Negative for congestion, rhinorrhea, sore throat and voice change.    Eyes: Negative.    Respiratory:  Negative for cough and shortness of breath.         Smoker    Cardiovascular:  Negative for chest pain and palpitations.   Gastrointestinal:  Negative for abdominal distention, abdominal pain, nausea and vomiting.   Endocrine: Negative.         Vitamin D def    Genitourinary:  Negative for difficulty urinating, flank pain and frequency.   Musculoskeletal:  Negative for arthralgias and myalgias.   Skin:  Negative for rash.   Allergic/Immunologic: Negative for environmental allergies.   Neurological:  Negative for headaches.   Hematological:  Negative for adenopathy.        Elevated WBC    Psychiatric/Behavioral:  Negative for sleep disturbance and suicidal ideas. The patient is nervous/anxious.         Anxiety   - marijuana intake- discussed affects on anxiety         Objective   /78 (BP Location: Left arm, Patient Position: Sitting, Cuff Size: Adult)   Pulse 101   Temp 97.8 °F (36.6 °C)   Ht 5' 4\" (1.626 m)   Wt 59 kg (130 lb)   SpO2 98%   BMI 22.31 kg/m²      Physical Exam  Vitals and nursing note reviewed.   Constitutional:       Appearance: Normal appearance.   HENT:      Head: Atraumatic.      Nose: No congestion or rhinorrhea.   Eyes:      Extraocular Movements: Extraocular movements intact.   Cardiovascular:      Rate and Rhythm: Normal rate and regular rhythm.      Pulses: Normal pulses.      Heart sounds: Normal heart sounds.   Pulmonary:      Effort: Pulmonary effort is normal.   Abdominal:      Palpations: Abdomen is soft.   Musculoskeletal:      Cervical back: Normal range of motion.      Right lower leg: No edema.      Left lower leg: No edema.   Skin:     General: Skin is warm.   Neurological:      Mental Status: She is alert and oriented to person, " place, and time.   Psychiatric:         Mood and Affect: Mood normal.         Behavior: Behavior normal.                Component  Ref Range & Units (hover) 1/14/25  7:31 AM 7/10/24 12:37 PM 1/18/24  1:47 PM 7/13/23 10:53 AM   WBC 8.86 13.08 High  10.79 High  11.28 High    RBC 4.72 4.34 4.64 4.67   Hemoglobin 14.4 13.5 14.0 14.3   Hematocrit 45.3 40.3 42.1 43.3   MCV 96 93 91 93   MCH 30.5 31.1 30.2 30.6   MCHC 31.8 33.5 33.3 33.0   RDW 13.1 13.1 12.9 12.8   MPV 11.3 11.7 11.1 11.6   Platelets 294 270 285 288   nRBC 0 0 0 0   Segmented %  66 R 51 R 60 R   Absolute Monocytes  0.94 R 0.81 R 0.88 R   Eosinophils Absolute  0.31 R 0.26 R 0.35 R   Basophils Absolute  0.10 R 0.09 R 0.09 R   Immature Grans %  1 R 0 R 0 R   Lymphocytes %  23 R 38 R 28 R   Monocytes %  7 R 8 R 8 R   Eosinophils Relative  2 R 2 R 3 R   Basophils Relative  1 R 1 R 1 R   Absolute Neutrophils  8.61 High  R 5.51 R 6.72 R   Absolute Immature Grans  0.06 R 0.03 R 0.05 R   Absolute Lymphocytes  3.06 R 4.09 R 3.19 R        Component  Ref Range & Units (hover) 1/14/25  7:31 AM 7/10/24 12:37 PM 7/13/23 10:53 AM   Sodium 138 139 140   Potassium 4.1 4.2 4.1   Chloride 105 110 High  113 High    CO2 27 20 Low  21   ANION GAP 6 9 6 R   BUN 18 16 14   Creatinine 0.77 0.77 CM 0.83 CM   Comment: Standardized to IDMS reference method   Glucose, Fasting 102 High   91 CM   Calcium 8.7 8.6 8.8 R   AST 15 12 Low  12 R, CM   ALT 12 6 Low  CM 12 R, CM   Comment: Specimen collection should occur prior to Sulfasalazine administration due to the potential for falsely depressed results.   Alkaline Phosphatase 53 50 56 R   Total Protein 6.8 6.6 7.1   Albumin 4.1 3.9 3.7   Total Bilirubin 0.41 0.54 CM 0.63 CM   Comment: Use of this assay is not recommended for patients undergoing treatment with eltrombopag due to the potential for falsely elevated results.  N-acetyl-p-benzoquinone imine (metabolite of Acetaminophen) will generate erroneously low results in samples for  patients that have taken an overdose of Acetaminophen.   eGFR 96 96 89             Vitamin D 25 hydroxy  Order: [id number]   Status: Final result       Next appt: 02/10/2025 at 08:30 AM in Hematology and Oncology (Shanta Saleh PA-C)       Dx: Vitamin D deficiency; Other elevated ...    Test Result Released: Yes (seen)       Messages: Seen    1 Result Note       1 Patient Communication       View Follow-Up Encounter      Component  Ref Range & Units (hover) 1/14/25  7:31 AM   Vit D, 25-Hydroxy 10.2 Low                Component  Ref Range & Units (hover) 1/14/25  7:31 AM 7/10/24 12:37 PM 1/18/24  1:47 PM 7/13/23 10:53 AM   Total Counted 100      Segmented % 43 66 R 51 R 60 R   Bands % 5      Lymphocytes % 35 23 R 38 R 28 R   Monocytes % 9 7 8 8   Eosinophils % 7 High  2 2 3   Basophils % 1 1 1 1   Absolute Neutrophils 4.25 8.61 High  R 5.51 R 6.72 R   Absolute Lymphocytes 3.10 3.06 R 4.09 R 3.19 R   Absolute Monocytes 0.80 0.94 R 0.81 R 0.88 R   Absolute Eosinophils 0.62 High  0.31 R 0.26 R 0.35 R   Absolute Basophils 0.09 0.10 R 0.09 R 0.09 R   RBC Morphology Normal      Platelet Estimate Adequate                Specimen Collected: 01/14/25  7:31 AM Last Resulted: 01/14/25  9:52 PM        Lab Flowsheet        Order Details        View Encounter        Lab and Collection Details        Routing        Result History     View All Conversations on this Encounter     Lyme Total AB W Reflex to IGM/IGG  Order: [id number] - Part of Panel Order [id number]   Status: Final result       Next appt: 02/10/2025 at 08:30 AM in Hematology and Oncology (Shanta Saleh PA-C)       Dx: Leukocytosis, unspecified type    Test Result Released: Yes (seen)    0 Result Notes      Component  Ref Range & Units (hover) 1/14/25  7:31 AM   Lyme Total Antibodies Negative             Specimen Collected: 01/14/25  7:31 AM Last Resulted: 01/15/2       Administrative Statements   I have spent a total time of 45  minutes in caring  for this patient on the day of the visit/encounter including Diagnostic results, Prognosis, Risks and benefits of tx options, Instructions for management, Patient and family education, Importance of tx compliance, Risk factor reductions, Impressions, Counseling / Coordination of care, Documenting in the medical record, Reviewing / ordering tests, medicine, procedures  , and Obtaining or reviewing history  . Topics discussed with the patient / family include symptom assessment and management, medication review, medication adjustment, and goals of care.

## 2025-02-10 ENCOUNTER — OFFICE VISIT (OUTPATIENT)
Dept: HEMATOLOGY ONCOLOGY | Facility: CLINIC | Age: 40
End: 2025-02-10
Payer: COMMERCIAL

## 2025-02-10 VITALS
HEART RATE: 70 BPM | BODY MASS INDEX: 22.96 KG/M2 | RESPIRATION RATE: 18 BRPM | OXYGEN SATURATION: 99 % | HEIGHT: 64 IN | SYSTOLIC BLOOD PRESSURE: 108 MMHG | WEIGHT: 134.5 LBS | TEMPERATURE: 98.3 F | DIASTOLIC BLOOD PRESSURE: 68 MMHG

## 2025-02-10 DIAGNOSIS — D72.829 LEUKOCYTOSIS, UNSPECIFIED TYPE: Primary | ICD-10-CM

## 2025-02-10 PROCEDURE — 99212 OFFICE O/P EST SF 10 MIN: CPT | Performed by: PHYSICIAN ASSISTANT

## 2025-02-10 NOTE — PROGRESS NOTES
Name: Ronni Thrasher      : 1985      MRN: [id number]  Encounter Provider: Shanta Saleh PA-C  Encounter Date: 2/10/2025   Encounter department: [facility] [place]  :  39-year-old female with current tobacco use who is seen in follow-up for leukocytosis.  She has had a persistent leukocytosis for at least the past year without development of cytopenias or constitutional symptoms.  At the time of initial evaluation, her WBC was 13,000.  She did have a chronic dental infection and underwent dental extraction in October of last year.  Her most recent CBC shows complete resolution of leukocytosis.  Patient has no acute complaints and overall is feeling well.  Physical exam is unremarkable.  Assessment & Plan  Leukocytosis, unspecified type  Resolved   There is mild increase in eosinophilia which new. She is asymptomatic I suspect that this reactive.   May have been related to chronic dental infection s/p dental extraction  She may have recurrent leukocytosis in the future secondary to tobacco use.  She will be referred back to PCP for ongoing observation, recommend CBC every 6 months initially   she will need referred back to our office with persistently worsening leukocytosis, eosinophilia with absolute eosinophil count greater than 1 and/or development of other cytopenias or constitutional symptoms, frequent infection etc. She voices understanding and is in agreement with plan Will send communication to PCP   Recommend age related cancer screenings (PAP, mammogram)            History of Present Illness   Chief Complaint   Patient presents with    Follow-up     38-year-old female with history of tobacco use, HGSIL s/p LEEP who has been referred by her PCP for evaluation of leukocytosis.     Limited data available for review in EMR.  She has had a persistent leukocytosis since at least 2023 with WBC ranging from 11-13,000 with mild neutrophilia ANC 8.6.  "Remainder of CBC normal. On review of outside hospital records, she was noted to have leukocytosis in 2004, 2007 in 2012 with WBC ranging from 11-19,000. She reports these labs were taken during each of her 3 pregnancies.  She had CBC in 2008 and 2011 that showed normal WBC count.  No known history of COPD, asthma, steroid use or autoimmune disease. struggling with anxiety, struggling with grieve with loss of mother, prior partner. she had intermittent chronic dental pain, afraid to go to dentist. Recent HIV and hepatitis C testing from 07/2023 were normal.  Recent HOLLY and RF were also normal.     She smokes 1.5ppd. she wants to quit.   Former alcohol use and opoid use.      No personal hx of cancer. Paternal grandmother had colon cancer. Maternal aunt has breast cancer     Pertinent Medical History   02/10/25:   She had dental extraction mid October.   She has less stress   Continues to smoke tobacco, no changes     Review of Systems   Constitutional:  Negative for fatigue, fever and unexpected weight change.        No night sweats   Respiratory:  Negative for shortness of breath.    Cardiovascular:  Positive for chest pain (intermittent, occurs with anciety and gas).   Gastrointestinal:  Negative for blood in stool.   Skin:  Negative for rash.   All other systems reviewed and are negative.          Objective   /68 (BP Location: Left arm, Patient Position: Sitting, Cuff Size: Adult)   Pulse 70   Temp 98.3 °F (36.8 °C) (Temporal)   Resp 18   Ht 5' 4\" (1.626 m)   Wt 61 kg (134 lb 8 oz)   SpO2 99%   BMI 23.09 kg/m²     Physical Exam  Vitals reviewed.   HENT:      Head: Normocephalic.   Cardiovascular:      Rate and Rhythm: Normal rate and regular rhythm.      Heart sounds: Normal heart sounds.   Pulmonary:      Effort: Pulmonary effort is normal.      Breath sounds: Normal breath sounds.   Abdominal:      Palpations: Abdomen is soft.      Tenderness: There is no abdominal tenderness.   Musculoskeletal:    "   Cervical back: Neck supple.   Lymphadenopathy:      Cervical: No cervical adenopathy.      Upper Body:      Right upper body: No axillary adenopathy.      Left upper body: No axillary adenopathy.   Skin:     Findings: No rash.   Neurological:      Mental Status: She is alert.         Labs: I have reviewed the following labs:  Results for orders placed or performed in visit on 01/14/25   CBC and differential   Result Value Ref Range    WBC 8.86 4.31 - 10.16 Thousand/uL    RBC 4.72 3.81 - 5.12 Million/uL    Hemoglobin 14.4 11.5 - 15.4 g/dL    Hematocrit 45.3 34.8 - 46.1 %    MCV 96 82 - 98 fL    MCH 30.5 26.8 - 34.3 pg    MCHC 31.8 31.4 - 37.4 g/dL    RDW 13.1 11.6 - 15.1 %    MPV 11.3 8.9 - 12.7 fL    Platelets 294 149 - 390 Thousands/uL    nRBC 0 /100 WBCs   Comprehensive metabolic panel   Result Value Ref Range    Sodium 138 135 - 147 mmol/L    Potassium 4.1 3.5 - 5.3 mmol/L    Chloride 105 96 - 108 mmol/L    CO2 27 21 - 32 mmol/L    ANION GAP 6 4 - 13 mmol/L    BUN 18 5 - 25 mg/dL    Creatinine 0.77 0.60 - 1.30 mg/dL    Glucose, Fasting 102 (H) 65 - 99 mg/dL    Calcium 8.7 8.4 - 10.2 mg/dL    AST 15 13 - 39 U/L    ALT 12 7 - 52 U/L    Alkaline Phosphatase 53 34 - 104 U/L    Total Protein 6.8 6.4 - 8.4 g/dL    Albumin 4.1 3.5 - 5.0 g/dL    Total Bilirubin 0.41 0.20 - 1.00 mg/dL    eGFR 96 ml/min/1.73sq m   Vitamin D 25 hydroxy   Result Value Ref Range    Vit D, 25-Hydroxy 10.2 (L) 30.0 - 100.0 ng/mL   Peripheral Smear   Result Value Ref Range    Total Counted 100     Segmented % 43 %    Bands % 5 0 - 8 %    Lymphocytes % 35 %    Monocytes % 9 4 - 12 %    Eosinophils % 7 (H) 0 - 6 %    Basophils % 1 0 - 1 %    Absolute Neutrophils 4.25 1.81 - 6.82 Thousand/uL    Absolute Lymphocytes 3.10 0.60 - 4.47 Thousand/uL    Absolute Monocytes 0.80 0.00 - 1.22 Thousand/uL    Absolute Eosinophils 0.62 (H) 0.00 - 0.61 Thousand/uL    Absolute Basophils 0.09 0.00 - 0.10 Thousand/uL    RBC Morphology Normal     Platelet Estimate  Adequate Adequate   Lyme Total AB W Reflex to IGM/IGG   Result Value Ref Range    Lyme Total Antibodies Negative Negative

## 2025-04-09 DIAGNOSIS — E55.9 VITAMIN D DEFICIENCY: ICD-10-CM

## 2025-04-09 RX ORDER — ERGOCALCIFEROL 1.25 MG/1
50000 CAPSULE, LIQUID FILLED ORAL WEEKLY
Qty: 12 CAPSULE | Refills: 1 | Status: SHIPPED | OUTPATIENT
Start: 2025-04-09 | End: 2025-07-08

## 2025-07-30 ENCOUNTER — APPOINTMENT (OUTPATIENT)
Dept: LAB | Facility: CLINIC | Age: 40
End: 2025-07-30
Payer: COMMERCIAL

## 2025-07-30 LAB
25(OH)D3 SERPL-MCNC: 55.6 NG/ML (ref 30–100)
BASOPHILS # BLD AUTO: 0.09 THOUSANDS/ÂΜL (ref 0–0.1)
BASOPHILS NFR BLD AUTO: 1 % (ref 0–1)
EOSINOPHIL # BLD AUTO: 0.36 THOUSAND/ÂΜL (ref 0–0.61)
EOSINOPHIL NFR BLD AUTO: 3 % (ref 0–6)
ERYTHROCYTE [DISTWIDTH] IN BLOOD BY AUTOMATED COUNT: 12.9 % (ref 11.6–15.1)
HCT VFR BLD AUTO: 43.7 % (ref 34.8–46.1)
HGB BLD-MCNC: 14.8 G/DL (ref 11.5–15.4)
IMM GRANULOCYTES # BLD AUTO: 0.04 THOUSAND/UL (ref 0–0.2)
IMM GRANULOCYTES NFR BLD AUTO: 0 % (ref 0–2)
LYMPHOCYTES # BLD AUTO: 3.27 THOUSANDS/ÂΜL (ref 0.6–4.47)
LYMPHOCYTES NFR BLD AUTO: 28 % (ref 14–44)
MCH RBC QN AUTO: 30.9 PG (ref 26.8–34.3)
MCHC RBC AUTO-ENTMCNC: 33.9 G/DL (ref 31.4–37.4)
MCV RBC AUTO: 91 FL (ref 82–98)
MONOCYTES # BLD AUTO: 0.93 THOUSAND/ÂΜL (ref 0.17–1.22)
MONOCYTES NFR BLD AUTO: 8 % (ref 4–12)
NEUTROPHILS # BLD AUTO: 6.93 THOUSANDS/ÂΜL (ref 1.85–7.62)
NEUTS SEG NFR BLD AUTO: 60 % (ref 43–75)
NRBC BLD AUTO-RTO: 0 /100 WBCS
PLATELET # BLD AUTO: 289 THOUSANDS/UL (ref 149–390)
PMV BLD AUTO: 11.3 FL (ref 8.9–12.7)
RBC # BLD AUTO: 4.79 MILLION/UL (ref 3.81–5.12)
TSH SERPL DL<=0.05 MIU/L-ACNC: 2.28 UIU/ML (ref 0.45–4.5)
WBC # BLD AUTO: 11.62 THOUSAND/UL (ref 4.31–10.16)

## 2025-07-31 LAB
ALBUMIN SERPL BCG-MCNC: 4.2 G/DL (ref 3.5–5)
ALP SERPL-CCNC: 51 U/L (ref 34–104)
ALT SERPL W P-5'-P-CCNC: 8 U/L (ref 7–52)
ANION GAP SERPL CALCULATED.3IONS-SCNC: 10 MMOL/L (ref 4–13)
AST SERPL W P-5'-P-CCNC: 15 U/L (ref 13–39)
BILIRUB SERPL-MCNC: 0.97 MG/DL (ref 0.2–1)
BUN SERPL-MCNC: 18 MG/DL (ref 5–25)
CALCIUM SERPL-MCNC: 9.3 MG/DL (ref 8.4–10.2)
CHLORIDE SERPL-SCNC: 107 MMOL/L (ref 96–108)
CO2 SERPL-SCNC: 22 MMOL/L (ref 21–32)
CREAT SERPL-MCNC: 0.83 MG/DL (ref 0.6–1.3)
GFR SERPL CREATININE-BSD FRML MDRD: 87 ML/MIN/1.73SQ M
GLUCOSE SERPL-MCNC: 118 MG/DL (ref 65–140)
POTASSIUM SERPL-SCNC: 4 MMOL/L (ref 3.5–5.3)
PROT SERPL-MCNC: 6.8 G/DL (ref 6.4–8.4)
SODIUM SERPL-SCNC: 139 MMOL/L (ref 135–147)

## 2025-08-08 ENCOUNTER — OFFICE VISIT (OUTPATIENT)
Dept: FAMILY MEDICINE CLINIC | Facility: CLINIC | Age: 40
End: 2025-08-08
Payer: COMMERCIAL

## 2025-08-08 VITALS
TEMPERATURE: 97.4 F | BODY MASS INDEX: 22.88 KG/M2 | DIASTOLIC BLOOD PRESSURE: 78 MMHG | HEART RATE: 72 BPM | HEIGHT: 64 IN | OXYGEN SATURATION: 97 % | SYSTOLIC BLOOD PRESSURE: 108 MMHG | WEIGHT: 134 LBS

## 2025-08-08 DIAGNOSIS — F17.200 TOBACCO USE DISORDER: ICD-10-CM

## 2025-08-08 DIAGNOSIS — D72.829 LEUKOCYTOSIS, UNSPECIFIED TYPE: ICD-10-CM

## 2025-08-08 DIAGNOSIS — N92.6 IRREGULAR PERIODS: ICD-10-CM

## 2025-08-08 DIAGNOSIS — B37.0 THRUSH: ICD-10-CM

## 2025-08-08 DIAGNOSIS — Z00.00 ANNUAL PHYSICAL EXAM: Primary | ICD-10-CM

## 2025-08-08 PROCEDURE — 99396 PREV VISIT EST AGE 40-64: CPT | Performed by: NURSE PRACTITIONER

## 2025-08-08 RX ORDER — NYSTATIN 100000 [USP'U]/ML
500000 SUSPENSION ORAL 4 TIMES DAILY
Qty: 200 ML | Refills: 0 | Status: SHIPPED | OUTPATIENT
Start: 2025-08-08 | End: 2025-08-18